# Patient Record
Sex: FEMALE | Race: WHITE | NOT HISPANIC OR LATINO | Employment: STUDENT | URBAN - METROPOLITAN AREA
[De-identification: names, ages, dates, MRNs, and addresses within clinical notes are randomized per-mention and may not be internally consistent; named-entity substitution may affect disease eponyms.]

---

## 2023-07-05 ENCOUNTER — OFFICE VISIT (OUTPATIENT)
Dept: INTERNAL MEDICINE | Facility: CLINIC | Age: 23
End: 2023-07-05
Payer: OTHER GOVERNMENT

## 2023-07-05 VITALS
BODY MASS INDEX: 23.66 KG/M2 | WEIGHT: 142 LBS | SYSTOLIC BLOOD PRESSURE: 130 MMHG | HEIGHT: 65 IN | DIASTOLIC BLOOD PRESSURE: 68 MMHG | HEART RATE: 98 BPM | OXYGEN SATURATION: 99 %

## 2023-07-05 DIAGNOSIS — Z76.89 ENCOUNTER TO ESTABLISH CARE WITH NEW DOCTOR: ICD-10-CM

## 2023-07-05 DIAGNOSIS — F41.9 ANXIETY DISORDER, UNSPECIFIED TYPE: Primary | ICD-10-CM

## 2023-07-05 DIAGNOSIS — F90.8 ATTENTION DEFICIT HYPERACTIVITY DISORDER (ADHD), OTHER TYPE: ICD-10-CM

## 2023-07-05 PROBLEM — F90.9 ATTENTION-DEFICIT HYPERACTIVITY DISORDER, UNSPECIFIED TYPE: Status: ACTIVE | Noted: 2020-10-21

## 2023-07-05 PROBLEM — J30.9 ALLERGIC RHINITIS: Status: ACTIVE | Noted: 2021-06-03

## 2023-07-05 PROBLEM — T24.039A: Status: ACTIVE | Noted: 2021-06-03

## 2023-07-05 PROBLEM — M54.50 LOW BACK PAIN: Status: ACTIVE | Noted: 2020-10-21

## 2023-07-05 PROCEDURE — 99203 OFFICE O/P NEW LOW 30 MIN: CPT | Mod: PBBFAC | Performed by: NURSE PRACTITIONER

## 2023-07-05 PROCEDURE — 99204 OFFICE O/P NEW MOD 45 MIN: CPT | Mod: S$PBB,,, | Performed by: NURSE PRACTITIONER

## 2023-07-05 PROCEDURE — 99999 PR PBB SHADOW E&M-NEW PATIENT-LVL III: ICD-10-PCS | Mod: PBBFAC,,, | Performed by: NURSE PRACTITIONER

## 2023-07-05 PROCEDURE — 99999 PR PBB SHADOW E&M-NEW PATIENT-LVL III: CPT | Mod: PBBFAC,,, | Performed by: NURSE PRACTITIONER

## 2023-07-05 PROCEDURE — 99204 PR OFFICE/OUTPT VISIT, NEW, LEVL IV, 45-59 MIN: ICD-10-PCS | Mod: S$PBB,,, | Performed by: NURSE PRACTITIONER

## 2023-07-05 RX ORDER — ESCITALOPRAM OXALATE 10 MG/1
10 TABLET ORAL DAILY
Qty: 90 TABLET | Refills: 0 | Status: SHIPPED | OUTPATIENT
Start: 2023-07-05 | End: 2023-10-03

## 2023-07-05 RX ORDER — METHYLPHENIDATE HYDROCHLORIDE 18 MG/1
18 TABLET, EXTENDED RELEASE ORAL
COMMUNITY
Start: 2023-06-13 | End: 2023-09-21

## 2023-07-05 RX ORDER — ESCITALOPRAM OXALATE 10 MG/1
10 TABLET ORAL
COMMUNITY
Start: 2023-06-17 | End: 2023-07-05 | Stop reason: SDUPTHER

## 2023-07-05 NOTE — PATIENT INSTRUCTIONS
Sent script for Lexapro to [place] [facility]    You will need to get refills on Concerta from previous prescriber as I cannot prescribe this    When you return, will need to establish with an MD for ongoing care and provide ADHD records to resume refills on that

## 2023-07-05 NOTE — PROGRESS NOTES
Subjective     Patient ID: Soraida Agarwal is a 22 y.o. female.    Chief Complaint: Medication Refill    Pt new to me, here for a refills on Concerta and Lexapro    She sees a therapist locally for Anxiety. Will be moving to [place] for 6 weeks, needs refills to tie her over til she comes back.    ADHD meds have been prescribed by her PCP in [place] who was continuing refills. I advised pt I cannot refill this and she would need to contact them for a temporary refill and when she returns back to [place], she will need to establish with an MD PCP and provide past ADHD records for them to resume refills.    No other issues    Review of Systems   Constitutional:  Negative for activity change, appetite change and unexpected weight change.   HENT:  Negative for dental problem and hearing loss.    Eyes:  Negative for visual disturbance.   Respiratory:  Negative for apnea, cough, chest tightness and shortness of breath.    Cardiovascular:  Negative for chest pain, palpitations and leg swelling.   Gastrointestinal:  Negative for abdominal distention, abdominal pain, anal bleeding, blood in stool, constipation, diarrhea, nausea, rectal pain and vomiting.   Endocrine: Negative for cold intolerance, heat intolerance, polydipsia, polyphagia and polyuria.   Genitourinary:  Negative for difficulty urinating, hematuria, menstrual problem, pelvic pain and vaginal pain.   Musculoskeletal:  Negative for arthralgias.   Integumentary:  Negative for color change.   Allergic/Immunologic: Negative for environmental allergies, food allergies and immunocompromised state.   Neurological:  Negative for dizziness, speech difficulty, weakness, light-headedness, numbness and headaches.   Hematological:  Negative for adenopathy. Does not bruise/bleed easily.   Psychiatric/Behavioral:  Negative for agitation, behavioral problems, sleep disturbance and suicidal ideas.      Review of patient's allergies indicates:   Allergen Reactions     "Pollen extracts Other (See Comments)     Current Outpatient Medications:     CONCERTA 18 mg CR tablet, Take 18 mg by mouth., Disp: , Rfl:     EScitalopram oxalate (LEXAPRO) 10 MG tablet, Take 10 mg by mouth., Disp: , Rfl:     Patient Active Problem List   Diagnosis    Allergic rhinitis    Anxiety disorder, unspecified    Attention-deficit hyperactivity disorder, unspecified type    Burn of lower leg    Low back pain     No past medical history on file.    No past surgical history on file.    Social History     Socioeconomic History    Marital status: Unknown     No family history on file.       Objective   Vitals:    07/05/23 1543   BP: 130/68   Pulse: 98   SpO2: 99%   Weight: 64.4 kg (141 lb 15.6 oz)   Height: 5' 5" (1.651 m)   PainSc: 0-No pain     Body mass index is 23.63 kg/m².    Physical Exam  Vitals and nursing note reviewed.   Constitutional:       Appearance: Normal appearance. She is normal weight.   HENT:      Head: Normocephalic.      Nose: Nose normal.      Mouth/Throat:      Mouth: Mucous membranes are moist.   Eyes:      Conjunctiva/sclera: Conjunctivae normal.   Cardiovascular:      Rate and Rhythm: Normal rate.   Pulmonary:      Effort: Pulmonary effort is normal.   Musculoskeletal:         General: Normal range of motion.      Cervical back: Normal range of motion.   Neurological:      General: No focal deficit present.      Mental Status: She is alert and oriented to person, place, and time.   Psychiatric:         Mood and Affect: Mood normal.         Behavior: Behavior normal.         Thought Content: Thought content normal.         Judgment: Judgment normal.          Assessment and Plan     1. Anxiety disorder, unspecified type  2. Attention deficit hyperactivity disorder (ADHD), other type  -     EScitalopram oxalate (LEXAPRO) 10 MG tablet; Take 1 tablet (10 mg total) by mouth once daily.  Dispense: 90 tablet; Refill: 0    Sent script for Lexapro to [place] [facility]    You will need to get " refills on Concerta from previous prescriber as I cannot prescribe this    When you return, will need to establish with an MD for ongoing care and provide ADHD records to resume refills on that    3. BMI 23.0-23.9, adult  BMI reviewed    4. Encounter to establish care with new doctor  Follow with one of MDs I work with who can be your new PCP for your annual: Dr. Alesia Villa, Dr. Catarino Sullivan    Follow up if symptoms worsen or fail to improve.

## 2023-08-24 ENCOUNTER — OFFICE VISIT (OUTPATIENT)
Dept: INTERNAL MEDICINE | Facility: CLINIC | Age: 23
End: 2023-08-24
Payer: OTHER GOVERNMENT

## 2023-08-24 ENCOUNTER — LAB VISIT (OUTPATIENT)
Dept: LAB | Facility: HOSPITAL | Age: 23
End: 2023-08-24
Payer: OTHER GOVERNMENT

## 2023-08-24 VITALS
DIASTOLIC BLOOD PRESSURE: 62 MMHG | SYSTOLIC BLOOD PRESSURE: 131 MMHG | BODY MASS INDEX: 24.28 KG/M2 | WEIGHT: 145.75 LBS | OXYGEN SATURATION: 98 % | HEIGHT: 65 IN | HEART RATE: 96 BPM

## 2023-08-24 DIAGNOSIS — G89.29 OTHER CHRONIC PAIN: ICD-10-CM

## 2023-08-24 DIAGNOSIS — M54.2 NECK PAIN: ICD-10-CM

## 2023-08-24 DIAGNOSIS — Z00.00 ANNUAL PHYSICAL EXAM: Primary | ICD-10-CM

## 2023-08-24 DIAGNOSIS — Z13.1 SCREENING FOR DIABETES MELLITUS (DM): ICD-10-CM

## 2023-08-24 DIAGNOSIS — Z13.220 SCREENING FOR LIPID DISORDERS: ICD-10-CM

## 2023-08-24 DIAGNOSIS — R53.82 CHRONIC FATIGUE: ICD-10-CM

## 2023-08-24 DIAGNOSIS — G47.411 NARCOLEPSY AND CATAPLEXY: ICD-10-CM

## 2023-08-24 LAB
25(OH)D3+25(OH)D2 SERPL-MCNC: 46 NG/ML (ref 30–96)
ALBUMIN SERPL BCP-MCNC: 4 G/DL (ref 3.5–5.2)
ALP SERPL-CCNC: 60 U/L (ref 55–135)
ALT SERPL W/O P-5'-P-CCNC: 16 U/L (ref 10–44)
ANION GAP SERPL CALC-SCNC: 8 MMOL/L (ref 8–16)
AST SERPL-CCNC: 21 U/L (ref 10–40)
BASOPHILS # BLD AUTO: 0.02 K/UL (ref 0–0.2)
BASOPHILS NFR BLD: 0.4 % (ref 0–1.9)
BILIRUB SERPL-MCNC: 0.4 MG/DL (ref 0.1–1)
BUN SERPL-MCNC: 8 MG/DL (ref 6–20)
CALCIUM SERPL-MCNC: 9.8 MG/DL (ref 8.7–10.5)
CHLORIDE SERPL-SCNC: 106 MMOL/L (ref 95–110)
CHOLEST SERPL-MCNC: 162 MG/DL (ref 120–199)
CHOLEST/HDLC SERPL: 3 {RATIO} (ref 2–5)
CO2 SERPL-SCNC: 25 MMOL/L (ref 23–29)
CREAT SERPL-MCNC: 0.7 MG/DL (ref 0.5–1.4)
CRP SERPL-MCNC: 0.7 MG/L (ref 0–8.2)
DIFFERENTIAL METHOD: NORMAL
EOSINOPHIL # BLD AUTO: 0.1 K/UL (ref 0–0.5)
EOSINOPHIL NFR BLD: 1.2 % (ref 0–8)
ERYTHROCYTE [DISTWIDTH] IN BLOOD BY AUTOMATED COUNT: 12.6 % (ref 11.5–14.5)
ERYTHROCYTE [SEDIMENTATION RATE] IN BLOOD BY PHOTOMETRIC METHOD: 9 MM/HR (ref 0–36)
EST. GFR  (NO RACE VARIABLE): >60 ML/MIN/1.73 M^2
ESTIMATED AVG GLUCOSE: 97 MG/DL (ref 68–131)
GLUCOSE SERPL-MCNC: 75 MG/DL (ref 70–110)
HBA1C MFR BLD: 5 % (ref 4–5.6)
HCT VFR BLD AUTO: 38.2 % (ref 37–48.5)
HCV AB SERPL QL IA: NORMAL
HDLC SERPL-MCNC: 54 MG/DL (ref 40–75)
HDLC SERPL: 33.3 % (ref 20–50)
HGB BLD-MCNC: 12.3 G/DL (ref 12–16)
HIV 1+2 AB+HIV1 P24 AG SERPL QL IA: NORMAL
IMM GRANULOCYTES # BLD AUTO: 0.01 K/UL (ref 0–0.04)
IMM GRANULOCYTES NFR BLD AUTO: 0.2 % (ref 0–0.5)
LDLC SERPL CALC-MCNC: 90.2 MG/DL (ref 63–159)
LYMPHOCYTES # BLD AUTO: 2.2 K/UL (ref 1–4.8)
LYMPHOCYTES NFR BLD: 43.1 % (ref 18–48)
MCH RBC QN AUTO: 28.4 PG (ref 27–31)
MCHC RBC AUTO-ENTMCNC: 32.2 G/DL (ref 32–36)
MCV RBC AUTO: 88 FL (ref 82–98)
MONOCYTES # BLD AUTO: 0.5 K/UL (ref 0.3–1)
MONOCYTES NFR BLD: 8.9 % (ref 4–15)
NEUTROPHILS # BLD AUTO: 2.3 K/UL (ref 1.8–7.7)
NEUTROPHILS NFR BLD: 46.2 % (ref 38–73)
NONHDLC SERPL-MCNC: 108 MG/DL
NRBC BLD-RTO: 0 /100 WBC
PLATELET # BLD AUTO: 292 K/UL (ref 150–450)
PMV BLD AUTO: 10.6 FL (ref 9.2–12.9)
POTASSIUM SERPL-SCNC: 4 MMOL/L (ref 3.5–5.1)
PROT SERPL-MCNC: 7.1 G/DL (ref 6–8.4)
RBC # BLD AUTO: 4.33 M/UL (ref 4–5.4)
SODIUM SERPL-SCNC: 139 MMOL/L (ref 136–145)
T4 FREE SERPL-MCNC: 0.85 NG/DL (ref 0.71–1.51)
TRIGL SERPL-MCNC: 89 MG/DL (ref 30–150)
TSH SERPL DL<=0.005 MIU/L-ACNC: 1.46 UIU/ML (ref 0.4–4)
VIT B12 SERPL-MCNC: 381 PG/ML (ref 210–950)
WBC # BLD AUTO: 5.03 K/UL (ref 3.9–12.7)

## 2023-08-24 PROCEDURE — 99999 PR PBB SHADOW E&M-EST. PATIENT-LVL IV: ICD-10-PCS | Mod: PBBFAC,,, | Performed by: INTERNAL MEDICINE

## 2023-08-24 PROCEDURE — 36415 COLL VENOUS BLD VENIPUNCTURE: CPT | Performed by: INTERNAL MEDICINE

## 2023-08-24 PROCEDURE — 83036 HEMOGLOBIN GLYCOSYLATED A1C: CPT | Performed by: INTERNAL MEDICINE

## 2023-08-24 PROCEDURE — 86140 C-REACTIVE PROTEIN: CPT | Performed by: INTERNAL MEDICINE

## 2023-08-24 PROCEDURE — 87389 HIV-1 AG W/HIV-1&-2 AB AG IA: CPT | Performed by: INTERNAL MEDICINE

## 2023-08-24 PROCEDURE — 86803 HEPATITIS C AB TEST: CPT | Performed by: INTERNAL MEDICINE

## 2023-08-24 PROCEDURE — 99395 PREV VISIT EST AGE 18-39: CPT | Mod: S$PBB,,, | Performed by: INTERNAL MEDICINE

## 2023-08-24 PROCEDURE — 85652 RBC SED RATE AUTOMATED: CPT | Performed by: INTERNAL MEDICINE

## 2023-08-24 PROCEDURE — 84439 ASSAY OF FREE THYROXINE: CPT | Performed by: INTERNAL MEDICINE

## 2023-08-24 PROCEDURE — 99214 OFFICE O/P EST MOD 30 MIN: CPT | Mod: PBBFAC | Performed by: INTERNAL MEDICINE

## 2023-08-24 PROCEDURE — 82607 VITAMIN B-12: CPT | Performed by: INTERNAL MEDICINE

## 2023-08-24 PROCEDURE — 84443 ASSAY THYROID STIM HORMONE: CPT | Performed by: INTERNAL MEDICINE

## 2023-08-24 PROCEDURE — 85025 COMPLETE CBC W/AUTO DIFF WBC: CPT | Performed by: INTERNAL MEDICINE

## 2023-08-24 PROCEDURE — 99395 PR PREVENTIVE VISIT,EST,18-39: ICD-10-PCS | Mod: S$PBB,,, | Performed by: INTERNAL MEDICINE

## 2023-08-24 PROCEDURE — 82306 VITAMIN D 25 HYDROXY: CPT | Performed by: INTERNAL MEDICINE

## 2023-08-24 PROCEDURE — 99999 PR PBB SHADOW E&M-EST. PATIENT-LVL IV: CPT | Mod: PBBFAC,,, | Performed by: INTERNAL MEDICINE

## 2023-08-24 PROCEDURE — 80053 COMPREHEN METABOLIC PANEL: CPT | Performed by: INTERNAL MEDICINE

## 2023-08-24 PROCEDURE — 80061 LIPID PANEL: CPT | Performed by: INTERNAL MEDICINE

## 2023-08-24 RX ORDER — MELOXICAM 7.5 MG/1
7.5 TABLET ORAL DAILY
Qty: 30 TABLET | Refills: 2 | Status: SHIPPED | OUTPATIENT
Start: 2023-08-24 | End: 2023-11-22

## 2023-08-24 NOTE — PROGRESS NOTES
Subjective:       Patient ID: Soraida Agarwal is a 23 y.o. female.    Chief Complaint: Establish Care    HPI      Presents to establish care.     PMHx    Anxiety on Lexapro also diagnosed with OCD and PTSD.     ADD: on Concerta     Chronic neck and back pain.     Chronic fatigue, takes frequent naps.       Health Maintenance:  HIV: order today   Hep C: order today   Lipids: order today   Pap Smear: needs to schedule with GYN   Vaccines:  up to date     Family hx of Hashimotos     Never smoker. Going to [place] for Grad school to study [place] language.         Review of Systems   Constitutional:  Negative for activity change, appetite change and chills.   HENT:  Negative for ear pain, sinus pressure/congestion and sneezing.    Respiratory:  Negative for cough and shortness of breath.    Cardiovascular:  Negative for chest pain, palpitations and leg swelling.   Gastrointestinal:  Negative for abdominal distention, abdominal pain, constipation, diarrhea, nausea and vomiting.   Genitourinary:  Negative for dysuria and hematuria.   Musculoskeletal:  Negative for arthralgias, back pain and myalgias.   Neurological:  Negative for dizziness and headaches.   Psychiatric/Behavioral:  Negative for agitation. The patient is not nervous/anxious.            No past medical history on file.  No past surgical history on file.   Patient Active Problem List   Diagnosis    Allergic rhinitis    Anxiety disorder, unspecified    Attention-deficit hyperactivity disorder, unspecified type    Burn of lower leg    Low back pain        Objective:      Physical Exam  Constitutional:       Appearance: Normal appearance.   HENT:      Head: Normocephalic.      Right Ear: Tympanic membrane normal.      Left Ear: Tympanic membrane normal.      Nose: Nose normal.   Cardiovascular:      Rate and Rhythm: Normal rate and regular rhythm.      Pulses: Normal pulses.      Heart sounds: Normal heart sounds.   Pulmonary:      Effort: Pulmonary effort is  normal.      Breath sounds: Normal breath sounds.   Abdominal:      General: Abdomen is flat. Bowel sounds are normal.      Palpations: Abdomen is soft.   Musculoskeletal:         General: Normal range of motion.      Cervical back: Normal range of motion and neck supple.   Skin:     General: Skin is warm and dry.   Neurological:      General: No focal deficit present.      Mental Status: She is alert and oriented to person, place, and time.   Psychiatric:         Mood and Affect: Mood normal.         Assessment:       Problem List Items Addressed This Visit    None  Visit Diagnoses       Annual physical exam    -  Primary    Narcolepsy and cataplexy        Relevant Orders    Ambulatory referral/consult to Sleep Disorders    Neck pain        Screening for lipid disorders        Relevant Orders    Lipid Panel    Screening for diabetes mellitus (DM)        Relevant Orders    Hemoglobin A1C    Chronic fatigue        Relevant Orders    Hepatitis C antibody    HIV 1/2 Ag/Ab (4th Gen)    Comprehensive Metabolic Panel    CBC Auto Differential    TSH    Sedimentation rate    C-REACTIVE PROTEIN    T4, FREE    Vitamin D 25 hydroxy    VITAMIN B12    Other chronic pain        Relevant Orders    Sedimentation rate    C-REACTIVE PROTEIN            Plan:         Soraida was seen today for establish care.    Diagnoses and all orders for this visit:    Annual physical exam  HIV: order today   Hep C: order today   Lipids: order today   Pap Smear: needs to schedule with GYN   Vaccines:  up to date     Narcolepsy and cataplexy  -     Ambulatory referral/consult to Sleep Disorders; Future    Neck pain  Other chronic pain  -     Sedimentation rate; Future  -     C-REACTIVE PROTEIN; Future    Screening for lipid disorders  -     Cancel: Lipid panel; Future  -     Lipid Panel; Future    Screening for diabetes mellitus (DM)  -     Hemoglobin A1C; Future    Chronic fatigue  Check labs and refer to sleep medicine               Tanya  MD Danette   Internal Medicine   Primary Care

## 2023-08-25 ENCOUNTER — PATIENT MESSAGE (OUTPATIENT)
Dept: INTERNAL MEDICINE | Facility: CLINIC | Age: 23
End: 2023-08-25
Payer: OTHER GOVERNMENT

## 2023-09-11 ENCOUNTER — PATIENT MESSAGE (OUTPATIENT)
Dept: INTERNAL MEDICINE | Facility: CLINIC | Age: 23
End: 2023-09-11
Payer: OTHER GOVERNMENT

## 2023-09-11 DIAGNOSIS — G47.411 NARCOLEPSY AND CATAPLEXY: Primary | ICD-10-CM

## 2023-09-21 ENCOUNTER — PATIENT MESSAGE (OUTPATIENT)
Dept: INTERNAL MEDICINE | Facility: CLINIC | Age: 23
End: 2023-09-21
Payer: OTHER GOVERNMENT

## 2023-09-21 RX ORDER — METHYLPHENIDATE HYDROCHLORIDE 18 MG/1
18 TABLET ORAL EVERY MORNING
Qty: 30 TABLET | Refills: 0 | Status: SHIPPED | OUTPATIENT
Start: 2023-09-21

## 2023-10-30 ENCOUNTER — PATIENT MESSAGE (OUTPATIENT)
Dept: INTERNAL MEDICINE | Facility: CLINIC | Age: 23
End: 2023-10-30
Payer: OTHER GOVERNMENT

## 2024-02-06 ENCOUNTER — PATIENT MESSAGE (OUTPATIENT)
Dept: INTERNAL MEDICINE | Facility: CLINIC | Age: 24
End: 2024-02-06
Payer: OTHER GOVERNMENT

## 2024-06-10 ENCOUNTER — PATIENT MESSAGE (OUTPATIENT)
Dept: INTERNAL MEDICINE | Facility: CLINIC | Age: 24
End: 2024-06-10
Payer: OTHER GOVERNMENT

## 2024-06-23 DIAGNOSIS — F41.9 ANXIETY DISORDER, UNSPECIFIED TYPE: ICD-10-CM

## 2024-06-23 NOTE — TELEPHONE ENCOUNTER
No care due was identified.  [facility] Embedded Care Due Messages. Reference number: [id number].   6/23/2024 10:23:32 AM CDT

## 2024-06-24 ENCOUNTER — HOSPITAL ENCOUNTER (OUTPATIENT)
Dept: RADIOLOGY | Facility: HOSPITAL | Age: 24
Discharge: HOME | End: 2024-06-24
Attending: INTERNAL MEDICINE
Payer: OTHER GOVERNMENT

## 2024-06-24 ENCOUNTER — OFFICE VISIT (OUTPATIENT)
Dept: INTERNAL MEDICINE | Facility: CLINIC | Age: 24
End: 2024-06-24
Payer: OTHER GOVERNMENT

## 2024-06-24 VITALS
HEIGHT: 65 IN | SYSTOLIC BLOOD PRESSURE: 106 MMHG | DIASTOLIC BLOOD PRESSURE: 73 MMHG | HEART RATE: 78 BPM | WEIGHT: 143.06 LBS | OXYGEN SATURATION: 99 % | BODY MASS INDEX: 23.83 KG/M2

## 2024-06-24 DIAGNOSIS — M54.50 LUMBAR BACK PAIN: ICD-10-CM

## 2024-06-24 DIAGNOSIS — F41.9 ANXIETY DISORDER, UNSPECIFIED TYPE: ICD-10-CM

## 2024-06-24 DIAGNOSIS — F90.8 ATTENTION DEFICIT HYPERACTIVITY DISORDER (ADHD), OTHER TYPE: ICD-10-CM

## 2024-06-24 DIAGNOSIS — M54.50 LUMBAR BACK PAIN: Primary | ICD-10-CM

## 2024-06-24 PROCEDURE — 72070 X-RAY EXAM THORAC SPINE 2VWS: CPT | Mod: TC

## 2024-06-24 PROCEDURE — 72070 X-RAY EXAM THORAC SPINE 2VWS: CPT | Mod: 26,,, | Performed by: RADIOLOGY

## 2024-06-24 PROCEDURE — 72100 X-RAY EXAM L-S SPINE 2/3 VWS: CPT | Mod: TC

## 2024-06-24 PROCEDURE — 72100 X-RAY EXAM L-S SPINE 2/3 VWS: CPT | Mod: 26,,, | Performed by: RADIOLOGY

## 2024-06-24 PROCEDURE — 99999 PR PBB SHADOW E&M-EST. PATIENT-LVL IV: CPT | Mod: PBBFAC,,, | Performed by: INTERNAL MEDICINE

## 2024-06-24 PROCEDURE — 99214 OFFICE O/P EST MOD 30 MIN: CPT | Mod: PBBFAC,25 | Performed by: INTERNAL MEDICINE

## 2024-06-24 PROCEDURE — 99214 OFFICE O/P EST MOD 30 MIN: CPT | Mod: S$PBB,,, | Performed by: INTERNAL MEDICINE

## 2024-06-24 RX ORDER — ESCITALOPRAM OXALATE 5 MG/1
TABLET ORAL
Qty: 30 TABLET | Refills: 11 | Status: SHIPPED | OUTPATIENT
Start: 2024-06-24

## 2024-06-24 RX ORDER — ESCITALOPRAM OXALATE 10 MG/1
10 TABLET ORAL DAILY
Qty: 90 TABLET | Refills: 3 | Status: SHIPPED | OUTPATIENT
Start: 2024-06-24

## 2024-06-24 NOTE — TELEPHONE ENCOUNTER
Refill Routing Note   Medication(s) are not appropriate for processing by [facility] for the following reason(s):        No active prescription written by provider: not yet signed by current PCP    [facility] action(s):  Defer      Medication Therapy Plan:         Appointments  past 12m or future 3m with PCP    Date Provider   Last Visit   8/24/2023 Tanya Samaniego MD   Next Visit   6/23/2024 Tanya Samaniego MD   ED visits in past 90 days: 0        Note composed:9:58 AM 06/24/2024

## 2024-06-24 NOTE — TELEPHONE ENCOUNTER
No care due was identified.  [facility] Embedded Care Due Messages. Reference number: [id number].   6/24/2024 9:58:39 AM CDT

## 2024-06-24 NOTE — PROGRESS NOTES
Subjective:       Patient ID: Soraida Agarwal is a 23 y.o. female.    Chief Complaint: Follow-up    Presents for follow up.     Having lumbar back pain. Worse with bending back or bending forward. Also worse with jumping.  The pain started in February on March while she was living in [place] and doing gymnastics.  She did see a physician there who recommended an MRI but also recommended she wait till she did back to the [facility] as the wait time would have been very long.  She has limited her exercises and movements because of the pain.    PMHx    Anxiety on Lexapro also diagnosed with OCD and PTSD.     ADD: on Concerta     Chronic neck and back pain.     Chronic fatigue, takes frequent naps.       Health Maintenance:  HIV:  Negative 2023  Hep C: Negative 2023  Lipids:  Normal August 20, 2023  Pap Smear: needs to schedule with GYN   Vaccines:  up to date     Family hx of Hashimotos     Never smoker. Going to [place] for Grad school to study English language.       Follow-up  Pertinent negatives include no abdominal pain, arthralgias, chest pain, chills, coughing, headaches, myalgias, nausea or vomiting.         Review of Systems   Constitutional:  Negative for activity change, appetite change and chills.   HENT:  Negative for ear pain, sinus pressure/congestion and sneezing.    Respiratory:  Negative for cough and shortness of breath.    Cardiovascular:  Negative for chest pain, palpitations and leg swelling.   Gastrointestinal:  Negative for abdominal distention, abdominal pain, constipation, diarrhea, nausea and vomiting.   Genitourinary:  Negative for dysuria and hematuria.   Musculoskeletal:  Negative for arthralgias, back pain and myalgias.   Neurological:  Negative for dizziness and headaches.   Psychiatric/Behavioral:  Negative for agitation. The patient is not nervous/anxious.            No past medical history on file.  No past surgical history on file.   Patient Active Problem List   Diagnosis    Allergic  rhinitis    Anxiety disorder, unspecified    Attention-deficit hyperactivity disorder, unspecified type    Burn of lower leg    Low back pain        Objective:      Physical Exam  Constitutional:       Appearance: Normal appearance.   HENT:      Head: Normocephalic.      Right Ear: Tympanic membrane normal.      Left Ear: Tympanic membrane normal.      Nose: Nose normal.   Cardiovascular:      Rate and Rhythm: Normal rate and regular rhythm.      Pulses: Normal pulses.      Heart sounds: Normal heart sounds.   Pulmonary:      Effort: Pulmonary effort is normal.      Breath sounds: Normal breath sounds.   Abdominal:      General: Abdomen is flat. Bowel sounds are normal.      Palpations: Abdomen is soft.   Musculoskeletal:         General: Normal range of motion.      Cervical back: Normal range of motion and neck supple.   Skin:     General: Skin is warm and dry.   Neurological:      General: No focal deficit present.      Mental Status: She is alert and oriented to person, place, and time.   Psychiatric:         Mood and Affect: Mood normal.         Assessment:       Problem List Items Addressed This Visit    None        Plan:         Soraida was seen today for follow-up.    Diagnoses and all orders for this visit:    Lumbar back pain  -     X-Ray Lumbar Spine 2 Or 3 Views; Future  -     Ambulatory referral/consult to Physical/Occupational Therapy; Future  -     X-Ray Thoracic Spine AP Lateral; Future  Check x-rays today in place referral to PT  Consider MRI if x-rays do not show a cause of pain    Attention deficit hyperactivity disorder (ADHD), other type  Continue Methylpenidate 18 mg daily   Doing well on this dose. Denies any chest pain, palpitations, insomnia or decreased appetite.      Anxiety disorder, unspecified type  -     EScitalopram oxalate (LEXAPRO) 5 MG Tab; Take in addition to 10 mg tablet the week before cycle begins.  Doing well on this dose.  She is now started taking 10 mg the week before her  cycle and this has been working out well for her        Tanya Samaniego MD   Internal Medicine   Primary Care

## 2024-06-24 NOTE — TELEPHONE ENCOUNTER
Refill Routing Note   Medication(s) are not appropriate for processing by [facility] for the following reason(s):        Outside of protocol    [facility] action(s):  Route               Appointments  past 12m or future 3m with PCP    Date Provider   Last Visit   8/24/2023 Tanya Samaniego MD   Next Visit   6/24/2024 Tanya Samaniego MD   ED visits in past 90 days: 0        Note composed:12:46 PM 06/24/2024

## 2024-06-25 ENCOUNTER — PATIENT MESSAGE (OUTPATIENT)
Dept: INTERNAL MEDICINE | Facility: CLINIC | Age: 24
End: 2024-06-25
Payer: OTHER GOVERNMENT

## 2024-06-25 RX ORDER — METHYLPHENIDATE HYDROCHLORIDE 18 MG/1
18 TABLET ORAL EVERY MORNING
Qty: 30 TABLET | Refills: 0 | Status: SHIPPED | OUTPATIENT
Start: 2024-06-25

## 2024-06-26 ENCOUNTER — TELEPHONE (OUTPATIENT)
Dept: INTERNAL MEDICINE | Facility: CLINIC | Age: 24
End: 2024-06-26
Payer: OTHER GOVERNMENT

## 2024-06-26 DIAGNOSIS — M54.50 LUMBAR BACK PAIN: ICD-10-CM

## 2024-06-26 DIAGNOSIS — M54.9 DORSALGIA, UNSPECIFIED: Primary | ICD-10-CM

## 2024-06-26 NOTE — TELEPHONE ENCOUNTER
----- Message from Tanya Samaniego MD sent at 6/26/2024 12:44 PM CDT -----  Please help her schedule the MRI

## 2024-06-27 ENCOUNTER — HOSPITAL ENCOUNTER (OUTPATIENT)
Dept: RADIOLOGY | Facility: HOSPITAL | Age: 24
Discharge: HOME | End: 2024-06-27
Attending: INTERNAL MEDICINE
Payer: OTHER GOVERNMENT

## 2024-06-27 DIAGNOSIS — M54.9 DORSALGIA, UNSPECIFIED: ICD-10-CM

## 2024-06-27 DIAGNOSIS — M54.2 NECK PAIN: Primary | ICD-10-CM

## 2024-06-27 DIAGNOSIS — M54.50 LUMBAR BACK PAIN: ICD-10-CM

## 2024-06-27 PROCEDURE — 72148 MRI LUMBAR SPINE W/O DYE: CPT | Mod: 26,,, | Performed by: INTERNAL MEDICINE

## 2024-06-27 PROCEDURE — 72148 MRI LUMBAR SPINE W/O DYE: CPT | Mod: TC

## 2024-06-27 NOTE — TELEPHONE ENCOUNTER
----- Message from Homa Rivera sent at 6/27/2024  1:46 PM CDT -----  Contact: [phone]  Type: Returning a call    Who left a message? Missed the call     When did the practice call? 1:00 Pm     Does patient know what this is regarding: Imaging results     Would the patient rather a call back or a response via [facility]? Call back     Comments:

## 2024-06-28 ENCOUNTER — PATIENT MESSAGE (OUTPATIENT)
Dept: INTERNAL MEDICINE | Facility: CLINIC | Age: 24
End: 2024-06-28
Payer: OTHER GOVERNMENT

## 2024-06-28 ENCOUNTER — TELEPHONE (OUTPATIENT)
Dept: INTERNAL MEDICINE | Facility: CLINIC | Age: 24
End: 2024-06-28

## 2024-06-28 DIAGNOSIS — M54.2 NECK PAIN: Primary | ICD-10-CM

## 2024-06-28 NOTE — TELEPHONE ENCOUNTER
Pt now requesting films of c-spine, states she has pain since 2019 that interferes with her daily life.Pended if appropriate

## 2024-06-29 ENCOUNTER — HOSPITAL ENCOUNTER (OUTPATIENT)
Dept: RADIOLOGY | Facility: HOSPITAL | Age: 24
Discharge: HOME | End: 2024-06-29
Attending: INTERNAL MEDICINE
Payer: OTHER GOVERNMENT

## 2024-06-29 DIAGNOSIS — M54.2 NECK PAIN: ICD-10-CM

## 2024-06-29 PROCEDURE — 72052 X-RAY EXAM NECK SPINE 6/>VWS: CPT | Mod: 26,,, | Performed by: RADIOLOGY

## 2024-06-29 PROCEDURE — 72052 X-RAY EXAM NECK SPINE 6/>VWS: CPT | Mod: TC

## 2024-07-01 ENCOUNTER — TELEPHONE (OUTPATIENT)
Dept: PAIN MEDICINE | Facility: CLINIC | Age: 24
End: 2024-07-01
Payer: OTHER GOVERNMENT

## 2024-07-01 ENCOUNTER — OFFICE VISIT (OUTPATIENT)
Dept: ORTHOPEDICS | Facility: CLINIC | Age: 24
End: 2024-07-01
Payer: OTHER GOVERNMENT

## 2024-07-01 ENCOUNTER — PATIENT MESSAGE (OUTPATIENT)
Dept: ORTHOPEDICS | Facility: CLINIC | Age: 24
End: 2024-07-01

## 2024-07-01 VITALS — WEIGHT: 143.19 LBS | BODY MASS INDEX: 23.86 KG/M2 | HEIGHT: 65 IN

## 2024-07-01 DIAGNOSIS — M54.16 LUMBAR RADICULOPATHY: Primary | ICD-10-CM

## 2024-07-01 DIAGNOSIS — M50.30 DDD (DEGENERATIVE DISC DISEASE), CERVICAL: ICD-10-CM

## 2024-07-01 DIAGNOSIS — M50.30 DDD (DEGENERATIVE DISC DISEASE), CERVICAL: Primary | ICD-10-CM

## 2024-07-01 DIAGNOSIS — M54.50 LUMBAR BACK PAIN: ICD-10-CM

## 2024-07-01 DIAGNOSIS — M54.50 LUMBAR BACK PAIN: Primary | ICD-10-CM

## 2024-07-01 PROCEDURE — 99999 PR PBB SHADOW E&M-EST. PATIENT-LVL III: CPT | Mod: PBBFAC,,, | Performed by: ORTHOPAEDIC SURGERY

## 2024-07-01 PROCEDURE — 99213 OFFICE O/P EST LOW 20 MIN: CPT | Mod: PBBFAC | Performed by: ORTHOPAEDIC SURGERY

## 2024-07-01 PROCEDURE — 99205 OFFICE O/P NEW HI 60 MIN: CPT | Mod: S$PBB,,, | Performed by: ORTHOPAEDIC SURGERY

## 2024-07-01 NOTE — H&P (VIEW-ONLY)
DATE: 7/1/2024  PATIENT: Soraida Agarwal    Supervising Physician: Gabriel Noel M.D.    CHIEF COMPLAINT: neck pain    HISTORY:  Soraida Agarwal is a 23 y.o. female here for initial evaluation of low back and bilateral leg pain (Back - 3, Leg - 3). The pain has been present for years, worsening 5 months ago. Pt says she does regular sports/gymnastics The patient describes the pain as aching and randomly shooting down both legs.  The pain is worse with activity and improved by rest. There is positive associated numbness and tingling. There is negative subjective weakness. Prior treatments have included PT with continued home exercises for 8 weeks in the last 3 months, but no ESIs or surgery. It is the [employer] spine conditioning program. Exercises include head rolls, kneeling back extension, sitting rotation stretch, modified seated side straddle, knee to chest, bird dog, plank, modified seated plank, hip bridges, abdominal bracing, and abdominal crunch. Pt completes each exercise daily for 1 hour with worsening of pain.    The patient also has complaints of neck pain (Neck - 3, Arm - 0).  The pain has been present for years. The patient describes the pain as aching and radiating up causing headaches. The pain is worse with activity and improved by rest. There is negative associated numbness and tingling. There is negative subjective weakness. Prior treatments have included home exercises, but no ESIs or surgery. It is the [employer] cervical exercise program. Exercises include walking erectly with neutral head position, supine neutral head position, supine retraction, sitting or standing neck retraction, posture training, forward/backward/sideward isometric strengthening, prone head lifts, supine head lifts, scapular retraction, and neck rotation. Pt completes each exercise daily for 1 hour with worsening of pain.    The patient denies myelopathic symptoms such as handwriting changes or difficulty  with buttons/coins/keys. Denies perineal paresthesias, bowel/bladder dysfunction.    PAST MEDICAL/SURGICAL HISTORY:  History reviewed. No pertinent past medical history.  History reviewed. No pertinent surgical history.    Medications:   Current Outpatient Medications on File Prior to Visit   Medication Sig Dispense Refill    EScitalopram oxalate (LEXAPRO) 10 MG tablet Take 1 tablet (10 mg total) by mouth once daily. 90 tablet 3    EScitalopram oxalate (LEXAPRO) 5 MG Tab Take in addition to 10 mg tablet the week before cycle begins. 30 tablet 11    methylphenidate HCl 18 MG CR tablet Take 1 tablet (18 mg total) by mouth every morning. 30 tablet 0     No current facility-administered medications on file prior to visit.       Social History:   Social History     Socioeconomic History    Marital status: Unknown   Tobacco Use    Smoking status: Never    Smokeless tobacco: Never     Social Determinants of Health     Financial Resource Strain: Low Risk  (6/17/2024)    Overall Financial Resource Strain (CARDIA)     Difficulty of Paying Living Expenses: Not very hard   Food Insecurity: No Food Insecurity (6/17/2024)    Hunger Vital Sign     Worried About Running Out of Food in the Last Year: Never true     Ran Out of Food in the Last Year: Never true   Physical Activity: Sufficiently Active (6/17/2024)    Exercise Vital Sign     Days of Exercise per Week: 4 days     Minutes of Exercise per Session: 60 min   Stress: Stress Concern Present (6/17/2024)    [employer] [place] [employer] - Occupational Stress Questionnaire     Feeling of Stress : Rather much   Housing Stability: Unknown (6/17/2024)    Housing Stability Vital Sign     Unable to Pay for Housing in the Last Year: No       REVIEW OF SYSTEMS:  Constitution: Negative. Negative for chills, fever and night sweats.   Cardiovascular: Negative for chest pain and syncope.   Respiratory: Negative for cough and shortness of breath.   Gastrointestinal: See HPI.  "Negative for nausea/vomiting. Negative for abdominal pain.  Genitourinary: See HPI. Negative for discoloration or dysuria.  Skin: Negative for dry skin, itching and rash.   Hematologic/Lymphatic: Negative for bleeding problem. Does not bruise/bleed easily.   Musculoskeletal: Negative for falls and muscle weakness.   Neurological: See HPI. No seizures.   Endocrine: Negative for polydipsia, polyphagia and polyuria.   Allergic/Immunologic: Negative for hives and persistent infections.     EXAM:  Ht 5' 5" (1.651 m)   Wt 64.9 kg (143 lb 3 oz)   BMI 23.83 kg/m²     PHYSICAL EXAMINATION:    General: The patient is a very pleasant 23 y.o. female in no apparent distress, the patient is oriented to person, place and time.  Psych: Normal mood and affect  HEENT: Vision grossly intact, hearing intact to the spoken word.  Lungs: Respirations unlabored.  Gait: Normal station and gait, no difficulty with toe or heel walk.   Skin: Cervical skin and dorsal lumbar skin negative for rashes, lesions, hairy patches and surgical scars.    Range of motion: Cervical and lumbar range of motion is acceptable. There is negative tenderness to palpation of the paracervical muscles.  There is negative lumbar tenderness to palpation.  Spinal Balance: Global saggital and coronal spinal balance acceptable, no significant for scoliosis and kyphosis.  Musculoskeletal: No pain with the range of motion of the bilateral shoulders and elbows. Normal bulk and contour of the bilateral hands.  No pain with the range of motion of the bilateral hips. Mild bilateral trochanteric tenderness to palpation.  Vascular: Bilateral upper and lower extremities warm and well perfused, radial pulses 2+ bilaterally, dorsalis pedis pulses 2+ bilaterally.  Neurological: Normal strength and tone in all major motor groups in the bilateral upper and lower extremities. Normal sensation to light touch in the C5-T1 and L2-S1 dermatomes bilaterally.  Deep tendon reflexes " symmetric 2+ in the bilateral upper and lower extremities.  Negative Inverted Radial Reflex and Torrez's bilaterally. Negative Babinski bilaterally. Negative straight leg raise bilaterally.     IMAGING:   Today I personally reviewed AP, Lat and Flex/Ex  upright C-spine films that demonstrate minimal degenerative changes.    AP, Lat and Flex/Ex upright lumbar spine films demonstrate minimal degenerative changes.    MRI lumbar demonstrates central disc bulge at L4-5 resulting in mild stenosis    Body mass index is 23.83 kg/m².    Hemoglobin A1C   Date Value Ref Range Status   08/24/2023 5.0 4.0 - 5.6 % Final     Comment:     ADA Screening Guidelines:  5.7-6.4%  Consistent with prediabetes  >or=6.5%  Consistent with diabetes    High levels of fetal hemoglobin interfere with the HbA1C  assay. Heterozygous hemoglobin variants (HbS, HgC, etc)do  not significantly interfere with this assay.   However, presence of multiple variants may affect accuracy.           ASSESSMENT/PLAN:    Soraida was seen today for neck pain.    Diagnoses and all orders for this visit:    DDD (degenerative disc disease), cervical  -     MRI Cervical Spine Without Contrast; Future    Lumbar back pain  -     Ambulatory referral/consult to Orthopedics        Today we discussed at length all of the different treatment options including anti-inflammatories, acetaminophen, rest, ice, heat, physical therapy including strengthening and stretching exercises, home exercises, ROM, aerobic conditioning, aqua therapy, other modalities including ultrasound, massage, and dry needling, epidural steroid injections and finally surgical intervention.      Pt presents with chronic neck and low back pain with lumbar radiculopathy. Failure of conservative rx. Will obtain cervical MRI to further evaluate and call with results. Will order L4-5 ART with pain management.

## 2024-07-01 NOTE — TELEPHONE ENCOUNTER
----- Message from Jennifer Rolon PA-C sent at 2024 10:29 AM CDT -----  Regarding: Order for ANN-MARIE CASE    Patient Name: ANN-MARIE CASE([id number])  Sex: Female  : 2000      PCP: GEORGE SHI    Center: None     Types of orders made on 2024: Imaging, Outpatient Referral, Procedure                                       Request    Order Date:2024  Ordering User:JENNIFER ROLON [[id number]]  Encounter Pr  ovider:Jennifer Rolon PA-C [[id number]]  Authorizing Provider: Jennifer Rooln PA-C [[id number]]  Supervising Provider:VINCE GOLDSMITH [[id number]]  Type of Supervision:Supervision Required  Department:[facility][[id number]]    Common Order Information  Procedure -> Epidural Injection (specify level) Cmt: L4-5    Order Specific Information  Order: Procedure Order to Pain Management [Custom: [id number]]  Order   #:          [id number]: 1 FUTURE    Priority: Routine  Class: Clinic Performed    Future Order Information      Expires on:2025            Expected by:2024                   Associated Diagnoses      M54.50 Lumbar back pain      Facility Name: -> [facility]           Priority: Routine  Class: Clinic Performed    Future Order Information      Expires on:2025              Expected by:2024                   Associated Diagnoses      M54.50 Lumbar back pain      Procedure -> Epidural Injection (specify level) Cmt: L4-5        Facility Name: -> [facility]

## 2024-07-01 NOTE — PROGRESS NOTES
DATE: 7/1/2024  PATIENT: Soraida Agarwal    Supervising Physician: Gabriel Noel M.D.    CHIEF COMPLAINT: neck pain    HISTORY:  Soraida Agarwal is a 23 y.o. female here for initial evaluation of low back and bilateral leg pain (Back - 3, Leg - 3). The pain has been present for years, worsening 5 months ago. Pt says she does regular sports/gymnastics The patient describes the pain as aching and randomly shooting down both legs.  The pain is worse with activity and improved by rest. There is positive associated numbness and tingling. There is negative subjective weakness. Prior treatments have included PT with continued home exercises for 8 weeks in the last 3 months, but no ESIs or surgery. It is the [employer] spine conditioning program. Exercises include head rolls, kneeling back extension, sitting rotation stretch, modified seated side straddle, knee to chest, bird dog, plank, modified seated plank, hip bridges, abdominal bracing, and abdominal crunch. Pt completes each exercise daily for 1 hour with worsening of pain.    The patient also has complaints of neck pain (Neck - 3, Arm - 0).  The pain has been present for years. The patient describes the pain as aching and radiating up causing headaches. The pain is worse with activity and improved by rest. There is negative associated numbness and tingling. There is negative subjective weakness. Prior treatments have included home exercises, but no ESIs or surgery. It is the [employer] cervical exercise program. Exercises include walking erectly with neutral head position, supine neutral head position, supine retraction, sitting or standing neck retraction, posture training, forward/backward/sideward isometric strengthening, prone head lifts, supine head lifts, scapular retraction, and neck rotation. Pt completes each exercise daily for 1 hour with worsening of pain.    The patient denies myelopathic symptoms such as handwriting changes or difficulty  with buttons/coins/keys. Denies perineal paresthesias, bowel/bladder dysfunction.    PAST MEDICAL/SURGICAL HISTORY:  History reviewed. No pertinent past medical history.  History reviewed. No pertinent surgical history.    Medications:   Current Outpatient Medications on File Prior to Visit   Medication Sig Dispense Refill    EScitalopram oxalate (LEXAPRO) 10 MG tablet Take 1 tablet (10 mg total) by mouth once daily. 90 tablet 3    EScitalopram oxalate (LEXAPRO) 5 MG Tab Take in addition to 10 mg tablet the week before cycle begins. 30 tablet 11    methylphenidate HCl 18 MG CR tablet Take 1 tablet (18 mg total) by mouth every morning. 30 tablet 0     No current facility-administered medications on file prior to visit.       Social History:   Social History     Socioeconomic History    Marital status: Unknown   Tobacco Use    Smoking status: Never    Smokeless tobacco: Never     Social Determinants of Health     Financial Resource Strain: Low Risk  (6/17/2024)    Overall Financial Resource Strain (CARDIA)     Difficulty of Paying Living Expenses: Not very hard   Food Insecurity: No Food Insecurity (6/17/2024)    Hunger Vital Sign     Worried About Running Out of Food in the Last Year: Never true     Ran Out of Food in the Last Year: Never true   Physical Activity: Sufficiently Active (6/17/2024)    Exercise Vital Sign     Days of Exercise per Week: 4 days     Minutes of Exercise per Session: 60 min   Stress: Stress Concern Present (6/17/2024)    [employer] - Occupational Stress Questionnaire     Feeling of Stress : Rather much   Housing Stability: Unknown (6/17/2024)    Housing Stability Vital Sign     Unable to Pay for Housing in the Last Year: No       REVIEW OF SYSTEMS:  Constitution: Negative. Negative for chills, fever and night sweats.   Cardiovascular: Negative for chest pain and syncope.   Respiratory: Negative for cough and shortness of breath.   Gastrointestinal: See HPI.  "Negative for nausea/vomiting. Negative for abdominal pain.  Genitourinary: See HPI. Negative for discoloration or dysuria.  Skin: Negative for dry skin, itching and rash.   Hematologic/Lymphatic: Negative for bleeding problem. Does not bruise/bleed easily.   Musculoskeletal: Negative for falls and muscle weakness.   Neurological: See HPI. No seizures.   Endocrine: Negative for polydipsia, polyphagia and polyuria.   Allergic/Immunologic: Negative for hives and persistent infections.     EXAM:  Ht 5' 5" (1.651 m)   Wt 64.9 kg (143 lb 3 oz)   BMI 23.83 kg/m²     PHYSICAL EXAMINATION:    General: The patient is a very pleasant 23 y.o. female in no apparent distress, the patient is oriented to person, place and time.  Psych: Normal mood and affect  HEENT: Vision grossly intact, hearing intact to the spoken word.  Lungs: Respirations unlabored.  Gait: Normal station and gait, no difficulty with toe or heel walk.   Skin: Cervical skin and dorsal lumbar skin negative for rashes, lesions, hairy patches and surgical scars.    Range of motion: Cervical and lumbar range of motion is acceptable. There is negative tenderness to palpation of the paracervical muscles.  There is negative lumbar tenderness to palpation.  Spinal Balance: Global saggital and coronal spinal balance acceptable, no significant for scoliosis and kyphosis.  Musculoskeletal: No pain with the range of motion of the bilateral shoulders and elbows. Normal bulk and contour of the bilateral hands.  No pain with the range of motion of the bilateral hips. Mild bilateral trochanteric tenderness to palpation.  Vascular: Bilateral upper and lower extremities warm and well perfused, radial pulses 2+ bilaterally, dorsalis pedis pulses 2+ bilaterally.  Neurological: Normal strength and tone in all major motor groups in the bilateral upper and lower extremities. Normal sensation to light touch in the C5-T1 and L2-S1 dermatomes bilaterally.  Deep tendon reflexes " symmetric 2+ in the bilateral upper and lower extremities.  Negative Inverted Radial Reflex and Torrez's bilaterally. Negative Babinski bilaterally. Negative straight leg raise bilaterally.     IMAGING:   Today I personally reviewed AP, Lat and Flex/Ex  upright C-spine films that demonstrate minimal degenerative changes.    AP, Lat and Flex/Ex upright lumbar spine films demonstrate minimal degenerative changes.    MRI lumbar demonstrates central disc bulge at L4-5 resulting in mild stenosis    Body mass index is 23.83 kg/m².    Hemoglobin A1C   Date Value Ref Range Status   08/24/2023 5.0 4.0 - 5.6 % Final     Comment:     ADA Screening Guidelines:  5.7-6.4%  Consistent with prediabetes  >or=6.5%  Consistent with diabetes    High levels of fetal hemoglobin interfere with the HbA1C  assay. Heterozygous hemoglobin variants (HbS, HgC, etc)do  not significantly interfere with this assay.   However, presence of multiple variants may affect accuracy.           ASSESSMENT/PLAN:    Soraida was seen today for neck pain.    Diagnoses and all orders for this visit:    DDD (degenerative disc disease), cervical  -     MRI Cervical Spine Without Contrast; Future    Lumbar back pain  -     Ambulatory referral/consult to Orthopedics        Today we discussed at length all of the different treatment options including anti-inflammatories, acetaminophen, rest, ice, heat, physical therapy including strengthening and stretching exercises, home exercises, ROM, aerobic conditioning, aqua therapy, other modalities including ultrasound, massage, and dry needling, epidural steroid injections and finally surgical intervention.      Pt presents with chronic neck and low back pain with lumbar radiculopathy. Failure of conservative rx. Will obtain cervical MRI to further evaluate and call with results. Will order L4-5 ART with pain management.

## 2024-07-05 NOTE — PRE-PROCEDURE INSTRUCTIONS
Unable to reach pt via phone.  Left voicemail with arrival time also informing pt of need for responsible  accompaniment and instructing pt to follow pre-procedure instructions provided via [facility] portal.  The following message was sent to pt's portal.      Dear Soraida ,     You are scheduled for a procedure with Dr. Amor Vivar on 7/9/2024.       Your scheduled arrival time is 10:20 am.  This arrival time is roughly 1 hour before your anticipated procedure time to allow sufficient time for pre-op..       You will need to change into a gown for this procedure.  This procedure will take place at the [facility] at the corner of [facility] and [facility].  [facility] [place] [facility] to [facility].  The address is:     [address] [place] [address].  [place] [id number]     After entering the building, you will proceed to the second floor where you can check in with registration. You should take any medications that you routinely take for blood pressure, heart medications, thyroid, cholesterol, etc.      The fasting restrictions are dependent on whether or not you are receiving sedation.  Sedation is not available for all procedures.      Your fasting instructions are as follow:  Oral Sedation. You do not need to fast before this procedure.  You can eat and drink like normal.  You CANNOT drive yourself and must have a .     If you are on blood thinners, you need to follow the anticoagulation instructions that had been discussed previously.  You should only stop the blood thinners if it was approved by your primary care physician or your cardiologist.  In the event that you are not able to stop your blood thinners, a blood thinner was not listed on your medication list, or we were not able to get clearance from your cardiologist, then the procedure may have to be postponed/canceled.      IF you were told to stop your blood thinners, this is how long you should  generally hold some of the more common ones.  Remember that stopping blood thinners is only necessary for certain procedures. If you are unsure of your instructions, please call us.   Aspirin - 5 days  Plavix/Clopidogrel - 7 days  Warfarin / Coumadin - 5 days  Eliquis - 3 days  Pradaxa/Dabigatran - 4 days  Xarelto/Rivaroxaban - 3 days     If you are a diabetic, do not take your medication if you will be fasting, but bring it with you. Please plan on being here for roughly 2-3 hours.     Please call us if you have been sick (running fever, having any flu-like symptoms) or have been taking antibiotics in the past 2 weeks or had any outpatient procedures other than with us (colonoscopy, endoscopy, OBGYN, dental, etc.).      If you have been previously COVID positive, you will need to hold off on your procedure until you are symptom free for 10 days. If you did not have any symptoms, you can have your procedure 10 days from your positive test result.          On the morning of your procedure:  *HOLD ALL VITAMINS, MINERALS, HERBS (INCLUDING HERBAL TEAS) AND SUPPLEMENTS  *SHOWER WITH ANTIBACTERIAL SOAP (EX. DIAL) NIGHT BEFORE AND MORNING OF PROCEDURE  *DO NOT APPLY ANY LOTIONS, OILS, POWDERS, PERFUME/COLOGNE, OINTMENTS, GELS, CREAMS, MAKEUP OR DEODORANT TO YOUR SKIN MORNING OF PROCEDURE  *LEAVE JEWELRY AND ANY VALUABLES AT HOME  *WEAR LOOSE COMFORTABLE CLOTHING (PREFERABLY A BUTTON UP SHIRT)     Please reply to this message as receipt of delivery        Thank you,  [facility]  [place], LPN [facility]  Pre-Admit

## 2024-07-09 ENCOUNTER — HOSPITAL ENCOUNTER (OUTPATIENT)
Facility: HOSPITAL | Age: 24
Discharge: HOME | End: 2024-07-09
Attending: STUDENT IN AN ORGANIZED HEALTH CARE EDUCATION/TRAINING PROGRAM | Admitting: STUDENT IN AN ORGANIZED HEALTH CARE EDUCATION/TRAINING PROGRAM
Payer: OTHER GOVERNMENT

## 2024-07-09 VITALS
HEIGHT: 66 IN | BODY MASS INDEX: 23.3 KG/M2 | OXYGEN SATURATION: 99 % | WEIGHT: 145 LBS | TEMPERATURE: 99 F | RESPIRATION RATE: 16 BRPM | DIASTOLIC BLOOD PRESSURE: 60 MMHG | SYSTOLIC BLOOD PRESSURE: 108 MMHG | HEART RATE: 70 BPM

## 2024-07-09 DIAGNOSIS — M54.16 LUMBAR RADICULOPATHY: Primary | ICD-10-CM

## 2024-07-09 LAB
B-HCG UR QL: NEGATIVE
CTP QC/QA: YES

## 2024-07-09 PROCEDURE — 25500020 PHARM REV CODE 255: Performed by: STUDENT IN AN ORGANIZED HEALTH CARE EDUCATION/TRAINING PROGRAM

## 2024-07-09 PROCEDURE — 62323 NJX INTERLAMINAR LMBR/SAC: CPT | Mod: ,,, | Performed by: STUDENT IN AN ORGANIZED HEALTH CARE EDUCATION/TRAINING PROGRAM

## 2024-07-09 PROCEDURE — 81025 URINE PREGNANCY TEST: CPT | Performed by: STUDENT IN AN ORGANIZED HEALTH CARE EDUCATION/TRAINING PROGRAM

## 2024-07-09 PROCEDURE — 63600175 PHARM REV CODE 636 W HCPCS: Performed by: STUDENT IN AN ORGANIZED HEALTH CARE EDUCATION/TRAINING PROGRAM

## 2024-07-09 PROCEDURE — 25000003 PHARM REV CODE 250: Performed by: STUDENT IN AN ORGANIZED HEALTH CARE EDUCATION/TRAINING PROGRAM

## 2024-07-09 PROCEDURE — 62323 NJX INTERLAMINAR LMBR/SAC: CPT | Performed by: STUDENT IN AN ORGANIZED HEALTH CARE EDUCATION/TRAINING PROGRAM

## 2024-07-09 RX ORDER — ALPRAZOLAM 0.5 MG/1
1 TABLET, ORALLY DISINTEGRATING ORAL ONCE AS NEEDED
Status: COMPLETED | OUTPATIENT
Start: 2024-07-09 | End: 2024-07-09

## 2024-07-09 RX ORDER — LIDOCAINE HYDROCHLORIDE 20 MG/ML
INJECTION, SOLUTION EPIDURAL; INFILTRATION; INTRACAUDAL; PERINEURAL
Status: DISCONTINUED | OUTPATIENT
Start: 2024-07-09 | End: 2024-07-09 | Stop reason: HOSPADM

## 2024-07-09 RX ORDER — DEXAMETHASONE SODIUM PHOSPHATE 10 MG/ML
INJECTION INTRAMUSCULAR; INTRAVENOUS
Status: DISCONTINUED | OUTPATIENT
Start: 2024-07-09 | End: 2024-07-09 | Stop reason: HOSPADM

## 2024-07-09 RX ADMIN — ALPRAZOLAM 1 MG: 0.5 TABLET, ORALLY DISINTEGRATING ORAL at 11:07

## 2024-07-09 NOTE — DISCHARGE INSTRUCTIONS
[facility]  [employer] service # [phone]    POST-PROCEDURE INSTRUCTIONS:    Today you had an injection that included a steroid medications.  The steroid may or may not have been mixed with a local anesthetic when it was injected.   If the injection was in the neck, you may feel some pressure, numbness, or slight weakness in the arm after the procedure for a short period of time (this is a normal response), if this persists for longer than 1 day please contact our office or go to the emergency room.  If the injection was in the low back, you may feel some pressure, numbness, or slight weakness in the leg after the procedure for a short period of time (this is a normal response), if this persists for longer than 1 day please contact our office or go to the emergency room.  You may get side effects from the steroid.  This is not uncommon.  Symptoms include: elevated blood sugar, elevated blood pressure, headache, flushing, nausea, insomnia.  These symptoms are transient and will resolve within 1-3 days.  If symptoms last longer than this please contact our office or head to the emergency room.  Steroid medications can take anywhere from 3-14 days to take effect (rarely longer).  You may notice that your pain worsens for a short period of time after the injection, this would not be unusual due to the pressure and trauma from the needle.    If you do not have a follow up appointment scheduled, please contact my office (or the office of the physician who referred you for the procedure) to get a post-procedure follow up scheduled 2-4 weeks after the procedure.  This can be done as a virtual visit if that is more convenient for you.      What you need to do:    Keep a record of your response to the injection you had today.    How much relief did you get?   When did the relief start and how long did it last?  Were you able to decrease the use of any of your pain  medications?  Were you able to increase your level of activity?  How long did the relief last?    What to watch out for:    If you experience any of the following symptoms after your procedure, please notify the messaging service immediately (see above for contact information):   fever (increased oral temperature)   bleeding or swelling at the injection site,    drainage, rash or redness at the injection site    possible signs of infection    increased pain at the injection site   worsening of your usual pain   severe headache   new or worsening numbness    new arm and/or leg weakness, or    changes in bowel and/or bladder function: urinating or defecating on yourself and not knowing that you did it.    PLEASE FOLLOW ALL INSTRUCTIONS CAREFULLY     Do not engage in strenuous activity (e.g., lifting or pushing heavy objects or repeated bending) for 24 hours.     Do not take a bath, swim or use Jacuzzi for 24 hours after procedure. (A shower is fine).   Remove any Band-Aids when you get home.    Use cold/ice, as needed for comfort.  We recommend the use of cold therapy alternating on for 20 minutes, off for 20 minutes.    Do not apply direct heat (heating pad or heat packs) to the injection site for 24 hours.     Resume your usual medications, unless instructed otherwise by your Pain Physician.     If you are on warfarin (Coumadin) or other blood thinner, resume this medication as instructed by your prescribing Physician.    IF AT ANY POINT YOU ARE VERY CONCERNED ABOUT YOUR SYMPTOMS, PLEASE GO TO THE EMERGENCY ROOM.    If you develop worsening pain, weakness, numbness, lose bowel or bladder control (i.e., having an accident where you did not even know you had to go to the bathroom and suddenly noticed you soiled yourself), saddle anesthesia (a loss of sensation restricted to the area of the buttocks, anus and between the legs -- i.e., those parts of your body that would touch a saddle if you were sitting on one) you  need to go immediately to the emergency department for evaluation and treatment.    ----------------------------------------------------------------------------------------------------------------------------------------------------------------  If you received Sedation please read the following instructions:  POST SEDATION INSTRUCTIONS    Today you received intravenous medication (also known as sedation) that was used to help you relax and/or decrease discomfort during your procedure. This medication will be acting in your body for the next 24 hours, so you might feel a little tired or sleepy. This feeling will slowly wear off.   Common side effects associated with these medications include: drowsiness, dizziness, sleepiness, confusion, feeling excited, difficulty remembering things, lack of steadiness with walking or balance, loss of fine muscle control, slowed reflexes, difficulty focusing, and blurred vision.  Some over-the-counter and prescription medications (e.g., muscle relaxants, opioids, mood-altering medications, sedatives/hypnotics, antihistamines) can interact with the intravenous medication you received and cause an increased risk of the side effects listed above in addition to other potentially life threatening side effects. Use extreme caution if you are taking such medications, and consult with your Pain Physician or prescribing physician if you have any questions.  For the next 12-24 hours:    DO NOT--Drive a car, operate machinery or power tools   DO NOT--Drink any alcoholic beverages (not even beer), they may dangerously increase the risk of side effects.    DO NOT--Make any important legal or business decisions or sign important documents.  We advise you to have someone to assist you at home. Move slowly and carefully. Do not make sudden changes in position. Be aware of dizziness or light-headedness and move accordingly.   If you seek medical treatment within 24 hours, let the nurse or doctor  caring for you know that you have received the above medications. If you have any questions or concerns related to your sedation or treatment today please contact us.

## 2024-07-09 NOTE — DISCHARGE SUMMARY
[facility]  Discharge Note  Short Stay    Procedure(s) (LRB):  L4-5 ART (N/A)      OUTCOME: Patient tolerated treatment/procedure well without complication and is now ready for discharge.    DISPOSITION: Home or Self Care    FINAL DIAGNOSIS:  <principal problem not specified>    FOLLOWUP: In clinic    DISCHARGE INSTRUCTIONS:  No discharge procedures on file.     TIME SPENT ON DISCHARGE: 10 minutes

## 2024-07-09 NOTE — OP NOTE
"PROCEDURE:  LUMBAR L4-5 INTERLAMINAR EPIDURAL STEROID INJECTION    Patient Name: Soraida Agarwal  MRN: [id number]  DATE OF PROCEDURE: 07/09/2024    INJECTION # 1    DIAGNOSIS: Lumbar Radiculopathy  CPT CODE: 42908      POSTPROCEDURE DIAGNOSIS: Same    PHYSICIAN: Amor Vivar DO  NEEDLE TYPE: - 20G 3.5" Touhy Needle  MEDICATIONS INJECTED: 8cc mixture of 7cc Normal Saline + 10mg Dexamethasone (10mg/ml)  CONTRAST: Omni 300  LOSS OF RESISTANCE DEPTH: 7 cm    Sedation Medications - Oral Sedation     Estimated Blood Loss - <2ml  Drains: None  Specimens Removed: None  Urine Output - Not Measured  Complications: None  Outcome: Good    Informed Consent:  The patient's condition and proposed procedures, risks, and alternatives were discussed with the patient or responsible party.  The patient's / responsible party's questions were answered.   The patient / responsible party appeared to understand and chose to proceed.  Informed consent was obtained.  After obtaining written consent, an IV hep lock was placed. (See nurses notes for details).     Procedure in Detail:  The patient was taken back to the OR suite and placed in a prone position. The skin overlying the injection site was prepped and draped in an aseptic fashion. The target injection site (see above) was identified with fluoroscopy and marked.     Procedural Pause:  A procedural pause verifying correct patient, medical record number, allergies, medications to be administered, current vital signs, and surgical site was performed immediately prior to beginning the procedure.    The skin and subcutaneous tissue overlying the target site of injection for the L4-5 epidural steroid injection was/were anesthetized using 4 mL of 2% lidocaine with a 25-gauge, 1½-inch needle.  The above noted Tuohy needle was advanced under fluoroscopic guidance towards the epidural space. Lateral fluoroscopic imaging was used to confirm depth. The epidural space was identified using " a loss of resistance to saline technique. (See above for loss of resistance depth). A microbore extension tubing was attached to the needle to minimize any movement of the needle during injection or syringe change.  After negative aspiration for heme or CSF, 1ml of contrast was injected to confirm placement and no intrathecal or vascular spread.  After repeat negative aspiration for heme or CSF, the above noted steroid solution was slowly injected in increments. The needle was then retracted approximately retirement and the needle track was flushed with 0.5 ml of Lidocaine 2% to clear the needle prior to removal. The Tuohy needle was then removed.     The heart rate, pulse oximetry, and blood pressure were continuously monitored throughout the procedure.  The prpocedure was well tolerated. She was carefully escorted to the recovery room in stable condition. Patient was monitored by RN for recovery period.  The patient will be contacted in the next few days to determine extent of relief.  Patient was given post procedure and discharge instructions to follow at home.  The patient was discharged in a stable condition.    Note Electronically Signed By:  Amor Medina

## 2024-07-12 ENCOUNTER — HOSPITAL ENCOUNTER (OUTPATIENT)
Dept: RADIOLOGY | Facility: HOSPITAL | Age: 24
Discharge: HOME | End: 2024-07-12
Attending: ORTHOPAEDIC SURGERY
Payer: OTHER GOVERNMENT

## 2024-07-12 DIAGNOSIS — M50.30 DDD (DEGENERATIVE DISC DISEASE), CERVICAL: ICD-10-CM

## 2024-07-12 PROCEDURE — 72141 MRI NECK SPINE W/O DYE: CPT | Mod: TC

## 2024-07-12 PROCEDURE — 72141 MRI NECK SPINE W/O DYE: CPT | Mod: 26,,, | Performed by: RADIOLOGY

## 2024-07-16 ENCOUNTER — CLINICAL SUPPORT (OUTPATIENT)
Dept: REHABILITATION | Facility: HOSPITAL | Age: 24
End: 2024-07-16
Payer: OTHER GOVERNMENT

## 2024-07-16 DIAGNOSIS — M54.50 LUMBAR BACK PAIN: ICD-10-CM

## 2024-07-16 DIAGNOSIS — R29.898 DECREASED STRENGTH OF LOWER EXTREMITY: Primary | ICD-10-CM

## 2024-07-16 DIAGNOSIS — M50.30 DDD (DEGENERATIVE DISC DISEASE), CERVICAL: ICD-10-CM

## 2024-07-16 PROCEDURE — 97112 NEUROMUSCULAR REEDUCATION: CPT

## 2024-07-16 PROCEDURE — 97161 PT EVAL LOW COMPLEX 20 MIN: CPT

## 2024-07-23 ENCOUNTER — CLINICAL SUPPORT (OUTPATIENT)
Dept: REHABILITATION | Facility: HOSPITAL | Age: 24
End: 2024-07-23
Payer: OTHER GOVERNMENT

## 2024-07-23 DIAGNOSIS — R29.898 DECREASED STRENGTH OF LOWER EXTREMITY: Primary | ICD-10-CM

## 2024-07-23 PROCEDURE — 97112 NEUROMUSCULAR REEDUCATION: CPT

## 2024-07-25 ENCOUNTER — CLINICAL SUPPORT (OUTPATIENT)
Dept: REHABILITATION | Facility: HOSPITAL | Age: 24
End: 2024-07-25
Payer: OTHER GOVERNMENT

## 2024-07-25 DIAGNOSIS — F41.9 ANXIETY DISORDER, UNSPECIFIED TYPE: Primary | ICD-10-CM

## 2024-07-25 DIAGNOSIS — R29.898 DECREASED STRENGTH OF LOWER EXTREMITY: Primary | ICD-10-CM

## 2024-07-25 DIAGNOSIS — F90.8 ATTENTION DEFICIT HYPERACTIVITY DISORDER (ADHD), OTHER TYPE: ICD-10-CM

## 2024-07-25 PROCEDURE — 97112 NEUROMUSCULAR REEDUCATION: CPT

## 2024-07-25 NOTE — TELEPHONE ENCOUNTER
No care due was identified.  Health [facility] Embedded Care Due Messages. Reference number: [id number].   7/25/2024 4:50:40 PM CDT

## 2024-07-25 NOTE — PROGRESS NOTES
"[facility]   Physical Therapy Treatment Note     Name: Soraida Agarwal  Clinic Number: [id number]    Therapy Diagnosis:   Encounter Diagnosis   Name Primary?    Decreased strength of lower extremity Yes     Physician: Jennifer Rolon,*    Visit Date: 7/23/2024  Physician Orders: PT Eval and Treat   Medical Diagnosis from Referral: Lumbar back pain [M54.50], DDD (degenerative disc disease), cervical [M50.30]   Evaluation Date: 7/16/2024  Authorization Period Expiration: 12/31/2024  Plan of Care Expiration: 10/20/2024  Progress Note Due: 8/15/2024  Visit # / Visits authorized: 1/15   FOTO: 1/3    FOTO first follow up:   FOTO second follow up:     Time In: 9:04 am   Time Out: 9:56 am   Total Billable Time: 52 minutes (30)    SUBJECTIVE     Pt reports: Feels about the same, has been doing the exercises.  She was compliant with home exercise program.  Response to previous treatment: Independent in HEP   Functional change: Ongoing     Pain: 2/10  Location: bilateral back      OBJECTIVE     Objective Measures updated at progress report unless specified.     Treatment     Soraida received the treatments listed below:      therapeutic exercises to develop strength, endurance, ROM, flexibility, posture, and core stabilization for 03 minutes including:    Sidelying open books 1 x 15 reps each     manual therapy techniques: Joint mobilizations and Soft tissue Mobilization were applied to the: Hip for 06 minutes, including:    Long axis hip distraction grade II-III    neuromuscular re-education activities to improve: Balance, Coordination, Kinesthetic, Sense, Proprioception, and Posture for 43 minutes. The following activities were included:    TA activation with BP cuff (40-50 mmHg) 2 x 10 x 5" holds   TA activation with BP cuff and BKFO (40-50 mmHg) 1 x 10 reps each   TA activation with GreenTB in hooklying 2 x 10 x 3" holds   Sidelying clamshells with RedTB 2 x 12 x 3" holds   Standing " "paloff press GreenTB 2 x 10 reps   Sidelying hip ER 2 x 10 x 3" holds     therapeutic activities to improve functional performance for 00 minutes, including:      Patient Education and Home Exercises     Home Exercises Provided and Patient Education Provided     Education provided:   - Continue with HEP     Written Home Exercises Provided: Patient instructed to cont prior HEP. Exercises were reviewed and Soraida was able to demonstrate them prior to the end of the session.  Soraida demonstrated good  understanding of the education provided. See EMR under Patient Instructions for exercises provided during therapy sessions    ASSESSMENT     Soraida tolerated first physical therapy session well. She was further challenged with core stability with utilization of blood pressure cuff for biofeedback. She continues to be challenged with hip/glute strengthening as well with significant fatigue following hip intrinsic strengthening. Will continue to benefit from further strengthening and stability work in follow-ups. Will continue to monitor and progress as able.     Soraida Is progressing well towards her goals.   Pt prognosis is Good.     Pt will continue to benefit from skilled outpatient physical therapy to address the deficits listed in the problem list box on initial evaluation, provide pt/family education and to maximize pt's level of independence in the home and community environment.     Pt's spiritual, cultural and educational needs considered and pt agreeable to plan of care and goals.     Anticipated barriers to physical therapy: Scheduling, chronicity of symptoms     Goals:   Short Term Goals: 4 weeks (Progressing, not met)  Patient will be independent in initial HEP to help supplement PT.   Patient will report no pain with lumbar range of motion to help with ADLs.   Patient will improve hip extension to 10 degrees to help with gait mechanics.      Long Term Goals: 8-12 weeks (Progressing, not met)  Patient " will be independent in updated HEPT to help supplement PT and maintain gains made in PT.   Patient will improve FOTO score to >/= predicted (69) to show improvement in condition.   Patient will improve hip strength by 1/2 MMT to help with return to gymnastics.   Patient goal: Will be able to return to gymnastics pain free.    PLAN   Plan of care Certification: 7/16/2024 to 10/20/2024.     Continue with current plan of care with emphasis on core stability.    Melanie Arora, PT, DPT, OCS

## 2024-07-25 NOTE — PLAN OF CARE
[facility]   Physical Therapy Initial Evaluation      Name: Soraida Agarwal  Clinic Number: [id number]    Therapy Diagnosis:   Encounter Diagnoses   Name Primary?    Lumbar back pain     DDD (degenerative disc disease), cervical     Decreased strength of lower extremity Yes     Physician: Jennifer Rolon,*     Physician Orders: PT Eval and Treat   Medical Diagnosis from Referral: Lumbar back pain [M54.50], DDD (degenerative disc disease), cervical [M50.30]   Evaluation Date: 7/16/2024  Authorization Period Expiration: 12/31/2024  Plan of Care Expiration: 10/20/2024  Progress Note Due: 8/15/2024  Visit # / Visits authorized: 1/1   FOTO: 1/3    Precautions: Standard     Time In: 10:06 am   Time Out: 10:58 am   Total Appointment Time (timed & untimed codes): 52 minutes    Subjective     Date of onset: January-February 2024    History of current condition - Soraida reports: She participates in club gymnastics and around late January to early February her back started bothering her more. She was living in [place] at the time and unable to really get fully checked out and an MRI until she returned home. She pretty much stopped doing most things and anything that bothered her back. She had an MRI and was told she had herniated discs. She reports her muscles feel tight and hurt when she bends certain directions. She gets discomfort sleeping and carrying anything. She reports her hips will bother her more as well and that jumping, landing and hanging on the bars hurt. She has had sciatica before but no symptoms like that currently. She will at times get some tingling in her toes and some numbness down the back of her legs mainly on the right side. She describes the pain as more dull and achy currently. She also has some discomfort in her neck that has bothered her for years and was told she has some bone spurs. When her neck bothers her she gets bad headaches as well. She would like to be  able to return to gymnastics which she participates in club gymnastics and would like to continue to participate for as long as she can. She had a steroid injection last week.   She denies any other pertinent past medical history other than shin splints.     Imaging:   MRI Lumbar Spine (6/27/2024):  Impression:  Central disc extrusion at L4-5 resulting in mild canal stenosis.  No nerve root impingement.    MRI Cervical Spine (7/12/2024):  Impression:  No significant central or foraminal narrowing.  No focal protrusion or extrusion of disc material.  Minimal early posterior marginal osteophytic disc spur complex C3-C4 through C5-C6.     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    Current Level of Function: Increased pain in her back with difficulty with prolonged positions, bending, and participation in recreational activities.    Pain:  Current 3/10, worst 6/10, best 2/10   Location: bilateral back    Description: Aching, Dull, Tingling, and Numb  Aggravating Factors: Standing, Bending, Lifting, and recreational activities   Easing Factors: rest    Patients goals: To be able to continue doing gymnastics without immense amounts of pain.       Medical History:   No past medical history on file.    Surgical History:   Soraida Agarwal  has a past surgical history that includes Epidural steroid injection into lumbar spine (N/A, 7/9/2024).    Medications:   Soraida has a current medication list which includes the following prescription(s): escitalopram oxalate, escitalopram oxalate, and methylphenidate hcl.    Allergies:   Review of patient's allergies indicates:   Allergen Reactions    Pollen extracts Other (See Comments)        Objective      Observation: Patient presents with an overall pleasant general affect who does not appear to be in any  acute distress.     Posture: Patient presents with static posture of flattened lumbar spine, rounded shoulders, and hinge around mid-lower thoracic.     Gait: Patient ambulates independently into clinic with no assistance and displays good eduarda, decreased hip extension and slight hip drop.     Dermatomes:    Right Left    L2 Intact Intact   L3 Intact Intact   L4 Intact Intact   L5 Intact Intact   S1 Intact Intact   S2 Intact Intact     Myotomes:    Right  Left    L2 4/5* 4/5*   L3 4/5* 5/5*   L4 5/5 5/5   L5 5/5 5/5   S1 5/5 5/5   S2 5/5 5/5   *Patient reported back pain with L2 and L3 myotome assessment.     Range of Motion:  Lumbar   Percentage Pain   Flexion 75% No Pain   Extension 50% Pain   Right Sidebend  90% No Pain   Left Sidebend 60% Pain   Right Rotation 75% No Pain   Left Rotation 90% No Pain   Quadrant     Flex, R SB, R Rot N/A No Pain   Flex, L SB, L Rot N/A No Pain   Ext, R SB, R Rot N/A No Pain   Ext, L SB, L Rot N/A No Pain   *Noticeable increased hip flexion with lumbar flexion assessment, limited segmental mobility into flexion of lumbar spine.     Hip   Right  Left    Flexion 110 degrees  110 degrees    Extension 5 degrees*  5 degrees*    Internal Rotation 30 degrees  30 degrees    External Rotation 50 degrees  50 degrees    *Patient reported pain with hip extension in low back     Lower Extremity Strength (MMT):   Right Left   Hip Flexion 3+/5 4-/5   Hip Abduction 4-/5 4-/5   Hip Extension 3+/5 3+/5   Knee Flexion 4/5 4/5   Knee Extension 4+/5 4+/5   Dorsiflexion 4+/5 4+/5   Plantarflexion 4/5 4/5     Joint Mobility: decreased upper thoracic mobility with PA, increased lower lumbar PA, and decreased PA hip mobility.     Palpation: Tenderness to palpation and increased pressure erector spinae muscles and sacral mobs assessment.     Flexibility: Further assessment at follow-up sessions.      Special Tests:  Lumbar  - Prone Instability Test: (+)  - SLR Test: (-)    Sacroiliac   - SI Distraction:  "NT  - SI Compression: NT  - SI Thigh Thrust: (+)  - Sacral Thrust: (+)  - Flick Test: (+)   - Supine to Sit (ALS/PLS): Posteriorly rotated innominate on R     Functional Tests:  - DL Squat: Able to perform past parallel, no pain, increased hip external rotation.   - SL Squat / Step Down: Increased valgus noted right compared to left.    Intake Outcome Measure for FOTO Lumbar Survey    Therapist reviewed FOTO scores for Soraida Agarwal on 7/16/2024.   FOTO documents entered into [employer] - see Media section.    Intake Score: 54%          Treatment     Total Treatment time (time-based codes) separate from Evaluation: 25 minutes     Soraida received the treatments listed below:      therapeutic exercises to develop strength, endurance, ROM, flexibility, posture, and core stabilization for 00 minutes including:      manual therapy techniques: Joint mobilizations and Soft tissue Mobilization were applied to the: lumbar, SI, Hip for 09 minutes, including:    Prone 2-person SI manipulation grade V  Long axis hip distraction grade III      neuromuscular re-education activities to improve: Balance, Coordination, Kinesthetic, Sense, Proprioception, and Posture for 16 minutes. The following activities were included:    Pt education on PT POC, Prognosis, and HEP   Seated sciatic nerve glides 1 x 15 reps   Hip AB/ADD with belt and ball 5 x 10" holds each   Posterior Pelvic Tilt 1 x 10 x 5" holds   Sidelying open books 1 x 10 reps each     therapeutic activities to improve functional performance for 00 minutes, including:      gait training to improve functional mobility and safety for 00 minutes, including:      Patient Education and Home Exercises     Education provided:   - PT POC, Prognosis, and HEP     Written Home Exercises Provided: yes. Exercises were reviewed and Soraida was able to demonstrate them prior to the end of the session.  Soraida demonstrated good  understanding of the education provided. See EMR under " Patient Instructions for exercises provided during therapy sessions.    Assessment     Soraida is a 24 y.o. female referred to outpatient Physical Therapy with a medical diagnosis of Lumbar back pain [M54.50], DDD (degenerative disc disease), cervical [M50.30] . Patient presents with decreased range of motion, decreased strength, pain, gait abnormality, and functional limitations with prolonged positions, bending, and participation in recreational activities. Patient would benefit from skilled PT interventions to address above stated deficits and improve overall functional mobility.     Patient prognosis is Good.   Patient will benefit from skilled outpatient Physical Therapy to address the deficits stated above and in the chart below, provide patient /family education, and to maximize patientt's level of independence.     Plan of care discussed with patient: Yes  Patient's spiritual, cultural and educational needs considered and patient is agreeable to the plan of care and goals as stated below:     Anticipated Barriers for therapy: Scheduling, chronicity of symptoms     Medical Necessity is demonstrated by the following  History  Co-morbidities and personal factors that may impact the plan of care [] LOW: no personal factors / co-morbidities  [x] MODERATE: 1-2 personal factors / co-morbidities  [] HIGH: 3+ personal factors / co-morbidities    Moderate / High Support Documentation: anxiety, ADHD     Examination  Body Structures and Functions, activity limitations and participation restrictions that may impact the plan of care [] LOW: addressing 1-2 elements  [] MODERATE: 3+ elements  [x] HIGH: 3+ elements (please support below)    Moderate / High Support Documentation: range of motion, strength, pain, motor control, neuromuscular re-education, posture      Clinical Presentation [x] LOW: stable  [] MODERATE: Evolving  [] HIGH: Unstable     Decision Making/ Complexity Score: low       Goals:  Short Term Goals: 4  weeks   Patient will be independent in initial HEP to help supplement PT.   Patient will report no pain with lumbar range of motion to help with ADLs.   Patient will improve hip extension to 10 degrees to help with gait mechanics.     Long Term Goals: 8-12 weeks   Patient will be independent in updated HEPT to help supplement PT and maintain gains made in PT.   Patient will improve FOTO score to >/= predicted (69) to show improvement in condition.   Patient will improve hip strength by 1/2 MMT to help with return to gymnastics.   Patient goal: Will be able to return to gymnastics pain free.  Plan     Plan of care Certification: 7/16/2024 to 10/20/2024.    Outpatient Physical Therapy 1-2 times weekly for 12 weeks to include the following interventions: Electrical Stimulation , Gait Training, Manual Therapy, Moist Heat/ Ice, Neuromuscular Re-ed, Patient Education, Self Care, Therapeutic Activities, and Therapeutic Exercise.     Melanie Arora, PT, DPT, OCS

## 2024-07-25 NOTE — PROGRESS NOTES
"[facility]   Physical Therapy Treatment Note     Name: Soraida Agarwal  Clinic Number: [id number]    Therapy Diagnosis:   Encounter Diagnosis   Name Primary?    Decreased strength of lower extremity Yes     Physician: Jennifer Rolon,*    Visit Date: 7/25/2024  Physician Orders: PT Eval and Treat   Medical Diagnosis from Referral: Lumbar back pain [M54.50], DDD (degenerative disc disease), cervical [M50.30]   Evaluation Date: 7/16/2024  Authorization Period Expiration: 12/31/2024  Plan of Care Expiration: 10/20/2024  Progress Note Due: 8/15/2024  Visit # / Visits authorized: 2/15  FOTO: 1/3     Precautions: Standard      Time In: 0500 pm  Time Out: 0600 pm  Total Billable Time: 60 min     SUBJECTIVE     Pt reports: her back is feeling okay but her neck is hurting more today.  She was compliant with home exercise program.  Response to previous treatment: improved back pain   Functional change: no change    Pain: 2/10  Location: bilateral back      OBJECTIVE     Objective Measures updated at progress report unless specified.     Treatment       Soraida received the treatments listed below:      THERAPEUTIC EXERCISES to develop strength, endurance, ROM, flexibility, posture, and core stabilization for 00 minutes including:    MANUAL THERAPY TECHNIQUES including Joint mobilizations and Soft tissue Mobilization were applied to l/s for 00 minutes.    NEUROMUSCULAR RE-EDUCATION ACTIVITIES to improve Balance, Coordination, Kinesthetic, Sense, Proprioception, and Posture for 60 minutes.  The following were included:   BP cuff PPT 20-40 mmHg; 5 min  BP cuff BKFO; 2 x 10 ea  BP cuff mini march; 1 x 10 ea  SL hip pretzel 20 x 5" ea  PPT + Lat pull; GTB; 3 x 10 ea  Supine abdominal bracing + diaphragmatic breathing; 20x 5"   Modified side plank at wall + abdominal bracing; 4 x 30"     Lower abdominal leg lower to wall; 30 deg; 3 x 10 ---> add next    THERAPEUTIC ACTIVITIES to improve dynamic " and functional performance for 00 minutes including.      Patient Education and Home Exercises     Home Exercises Provided and Patient Education Provided     Education provided:   - continue HEP    Written Home Exercises Provided: Patient instructed to cont prior HEP. Exercises were reviewed and Soraida was able to demonstrate them prior to the end of the session.  Soraida demonstrated good understanding of the education provided. See EMR under Patient Instructions for exercises provided during therapy sessions    ASSESSMENT     Soraida struggled with abdominal bracing and TA activation with valsalva compensation prominent. She improved throughout session but was inconsistent by end. She was instructed to continued to practice this at home in order to allow progression. She will benefit continued core training and emphasis on posterior oblique sling where able.     Soraida is progressing well towards her goals.   Pt prognosis is Good.     Pt will continue to benefit from skilled outpatient physical therapy to address the deficits listed in the problem list box on initial evaluation, provide pt/family education and to maximize pt's level of independence in the home and community environment.     Pt's spiritual, cultural and educational needs considered and pt agreeable to plan of care and goals.     Anticipated barriers to physical therapy: Scheduling, chronicity of symptoms    Goals:   Short Term Goals: 4 weeks   Patient will be independent in initial HEP to help supplement PT. (Progressing, not met)  Patient will report no pain with lumbar range of motion to help with ADLs.  (Progressing, not met)  Patient will improve hip extension to 10 degrees to help with gait mechanics.  (Progressing, not met)     Long Term Goals: 8-12 weeks   Patient will be independent in updated HEPT to help supplement PT and maintain gains made in PT.  (Progressing, not met)  Patient will improve FOTO score to >/= predicted (69) to  show improvement in condition.  (Progressing, not met)  Patient will improve hip strength by 1/2 MMT to help with return to gymnastics.  (Progressing, not met)  Patient goal: Will be able to return to gymnastics pain free. (Progressing, not met)    PLAN     Progress core training and posterior oblique sling motor control     Michell Whitehead, PT, DPT, SCS

## 2024-07-26 ENCOUNTER — PATIENT MESSAGE (OUTPATIENT)
Dept: INTERNAL MEDICINE | Facility: CLINIC | Age: 24
End: 2024-07-26
Payer: OTHER GOVERNMENT

## 2024-07-26 RX ORDER — METHYLPHENIDATE HYDROCHLORIDE 18 MG/1
18 TABLET ORAL EVERY MORNING
Qty: 30 TABLET | Refills: 0 | Status: SHIPPED | OUTPATIENT
Start: 2024-07-26

## 2024-07-26 NOTE — TELEPHONE ENCOUNTER
Refill Routing Note   Medication(s) are not appropriate for processing by [facility] for the following reason(s):        Outside of protocol    [facility] action(s):  Route               Appointments  past 12m or future 3m with PCP    Date Provider   Last Visit   6/24/2024 Tanya Samaniego MD   Next Visit   Visit date not found Tanya Samaniego MD   ED visits in past 90 days: 0        Note composed:10:13 AM 07/26/2024

## 2024-07-30 ENCOUNTER — CLINICAL SUPPORT (OUTPATIENT)
Dept: REHABILITATION | Facility: HOSPITAL | Age: 24
End: 2024-07-30
Payer: OTHER GOVERNMENT

## 2024-07-30 DIAGNOSIS — R29.898 DECREASED STRENGTH OF LOWER EXTREMITY: Primary | ICD-10-CM

## 2024-07-30 PROCEDURE — 97112 NEUROMUSCULAR REEDUCATION: CPT

## 2024-07-30 NOTE — PROGRESS NOTES
"[facility]   Physical Therapy Treatment Note     Name: Soraida Agarwal  Clinic Number: [id number]    Therapy Diagnosis:   Encounter Diagnosis   Name Primary?    Decreased strength of lower extremity Yes     Physician: Jennifer Rolon,*    Visit Date: 7/30/2024  Physician Orders: PT Eval and Treat   Medical Diagnosis from Referral: Lumbar back pain [M54.50], DDD (degenerative disc disease), cervical [M50.30]   Evaluation Date: 7/16/2024  Authorization Period Expiration: 12/31/2024  Plan of Care Expiration: 10/20/2024  Progress Note Due: 8/15/2024  Visit # / Visits authorized: 3/15  FOTO: 1/3     Precautions: Standard      Time In: 0400 pm  Time Out: 0500 pm  Total Billable Time: 60 minutes    SUBJECTIVE     Pt reports: her back is feeling okay but her neck is hurting more today.  She was compliant with home exercise program.  Response to previous treatment: improved back pain   Functional change: no change    Pain: 2/10  Location: bilateral back      OBJECTIVE     Objective Measures updated at progress report unless specified.     Treatment       Soraida received the treatments listed below:      THERAPEUTIC EXERCISES to develop strength, endurance, ROM, flexibility, posture, and core stabilization for 00 minutes including:    MANUAL THERAPY TECHNIQUES including Joint mobilizations and Soft tissue Mobilization were applied to l/s for 00 minutes.    NEUROMUSCULAR RE-EDUCATION ACTIVITIES to improve Balance, Coordination, Kinesthetic, Sense, Proprioception, and Posture for 60 minutes.  The following were included:   Hip ER Pretzel; 20 x 5"   BP cuff PPT 20-40 mmHg; 5 min  BP cuff BKFO; 2 x 10 ea  BP cuff mini march; 1 x 10 ea  Abdominal bracing + glute set; 20 x 5"       NP Today:   PPT + Lat pull; GTB; 3 x 10 ea  Supine abdominal bracing + diaphragmatic breathing; 20x 5"   Modified side plank at wall + abdominal bracing; 4 x 30"   Lower abdominal leg lower to wall; 30 deg; 3 x 10 " ---> add next    THERAPEUTIC ACTIVITIES to improve dynamic and functional performance for 00 minutes including.      Patient Education and Home Exercises     Home Exercises Provided and Patient Education Provided     Education provided:   - continue HEP    Written Home Exercises Provided: Patient instructed to cont prior HEP. Exercises were reviewed and Soraida was able to demonstrate them prior to the end of the session.  Soraida demonstrated good understanding of the education provided. See EMR under Patient Instructions for exercises provided during therapy sessions    ASSESSMENT     Soraida demonstrated good improvement in BP cuff biofeedback drills as well as abdominal bracing drills. She struggled with addition of glute activation with notable hamstring co-contraction. She will benefit continued glute retraining and abdominal bracing.     Soraida is progressing well towards her goals.   Pt prognosis is Good.     Pt will continue to benefit from skilled outpatient physical therapy to address the deficits listed in the problem list box on initial evaluation, provide pt/family education and to maximize pt's level of independence in the home and community environment.     Pt's spiritual, cultural and educational needs considered and pt agreeable to plan of care and goals.     Anticipated barriers to physical therapy: Scheduling, chronicity of symptoms    Goals:   Short Term Goals: 4 weeks   Patient will be independent in initial HEP to help supplement PT. (Progressing, not met)  Patient will report no pain with lumbar range of motion to help with ADLs.  (Progressing, not met)  Patient will improve hip extension to 10 degrees to help with gait mechanics.  (Progressing, not met)     Long Term Goals: 8-12 weeks   Patient will be independent in updated HEPT to help supplement PT and maintain gains made in PT.  (Progressing, not met)  Patient will improve FOTO score to >/= predicted (69) to show improvement in  condition.  (Progressing, not met)  Patient will improve hip strength by 1/2 MMT to help with return to gymnastics.  (Progressing, not met)  Patient goal: Will be able to return to gymnastics pain free. (Progressing, not met)    PLAN     Progress core training and posterior oblique sling motor control     Michell Whitehead, PT, DPT, SCS

## 2024-07-31 ENCOUNTER — TELEPHONE (OUTPATIENT)
Dept: ORTHOPEDICS | Facility: CLINIC | Age: 24
End: 2024-07-31
Payer: OTHER GOVERNMENT

## 2024-08-01 ENCOUNTER — CLINICAL SUPPORT (OUTPATIENT)
Dept: REHABILITATION | Facility: HOSPITAL | Age: 24
End: 2024-08-01
Payer: OTHER GOVERNMENT

## 2024-08-01 ENCOUNTER — TELEPHONE (OUTPATIENT)
Dept: PAIN MEDICINE | Facility: CLINIC | Age: 24
End: 2024-08-01
Payer: OTHER GOVERNMENT

## 2024-08-01 DIAGNOSIS — R29.898 DECREASED STRENGTH OF LOWER EXTREMITY: Primary | ICD-10-CM

## 2024-08-01 PROCEDURE — 97112 NEUROMUSCULAR REEDUCATION: CPT

## 2024-08-01 NOTE — TELEPHONE ENCOUNTER
----- Message from Jennifer Rolon PA-C sent at 7/31/2024  3:35 PM CDT -----  Hi can we have patient fu with dr moreno/someone in [facility] pain management clinic? She had a lumbar ART with him a few weeks ago but would like to talk about treatment options for her neck. Thank you!      Contacted pt to assist in scheduling appointment with Dr. Moreno,  Pt advised me that she would return my call when she was ready to schedule appointment, she needed to check her schedule.  I verbalized comprehension.

## 2024-08-01 NOTE — PROGRESS NOTES
"[facility]   Physical Therapy Treatment Note     Name: Soraida Agarwal  Clinic Number: [id number]    Therapy Diagnosis:   Encounter Diagnosis   Name Primary?    Decreased strength of lower extremity Yes     Physician: Jennifer Rolon,*    Visit Date: 8/1/2024  Physician Orders: PT Eval and Treat   Medical Diagnosis from Referral: Lumbar back pain [M54.50], DDD (degenerative disc disease), cervical [M50.30]   Evaluation Date: 7/16/2024  Authorization Period Expiration: 12/31/2024  Plan of Care Expiration: 10/20/2024  Progress Note Due: 8/15/2024  Visit # / Visits authorized: 4/15  FOTO: 1/3     Precautions: Standard      Time In: 4:01 pm  Time Out: 4:54 pm  Total Billable Time: 53 minutes (30 min)    SUBJECTIVE     Pt reports: She is doing ok. Back is a little stiff but otherwise doing better. Still some stiffness/discomfort in her neck as well. .  She was compliant with home exercise program.  Response to previous treatment: improved back pain   Functional change: no change    Pain: 2/10  Location: bilateral back      OBJECTIVE     Objective Measures updated at progress report unless specified.     Treatment       Soraida received the treatments listed below:      THERAPEUTIC EXERCISES to develop strength, endurance, ROM, flexibility, posture, and core stabilization for 00 minutes including:    MANUAL THERAPY TECHNIQUES including Joint mobilizations and Soft tissue Mobilization were applied to l/s for 00 minutes.    NEUROMUSCULAR RE-EDUCATION ACTIVITIES to improve Balance, Coordination, Kinesthetic, Sense, Proprioception, and Posture for 53 minutes.  The following were included:     Sidelying hip ER pretzel glute isometrics 2 x 10 x 5" holds   BP cuff PPT (40-50 mmHg) 10 x 10" holds   BP cuff PPT with BKFO 2 x 10 reps each  TA activation with physioball 2 x 10 x 5" holds  TA activation with physioball with LE ext 2 x 8 reps   Quadruped flexion into rockback 2 x 10 reps " "  Quadruped mini LE extension with maintaining neutral spine 2 x 6 reps each  Prone middle trap 2 x 10 x 3" holds     NP Today:   PPT + Lat pull; GTB; 3 x 10 ea  Supine abdominal bracing + diaphragmatic breathing; 20x 5"   Modified side plank at wall + abdominal bracing; 4 x 30"   Lower abdominal leg lower to wall; 30 deg; 3 x 10 ---> add next  Hip ER Pretzel; 20 x 5"   BP cuff PPT 20-40 mmHg; 5 min  BP cuff BKFO; 2 x 10 ea  BP cuff mini march; 1 x 10 ea  Abdominal bracing + glute set; 20 x 5"     THERAPEUTIC ACTIVITIES to improve dynamic and functional performance for 00 minutes including.      Patient Education and Home Exercises     Home Exercises Provided and Patient Education Provided     Education provided:   - continue HEP    Written Home Exercises Provided: Patient instructed to cont prior HEP. Exercises were reviewed and Soraida was able to demonstrate them prior to the end of the session.  Soraida demonstrated good understanding of the education provided. See EMR under Patient Instructions for exercises provided during therapy sessions    ASSESSMENT     Tiffanie is continuing to progress well with good carryover with control with abdominal bracing work with blood pressure cuff. Progressed with further core stability work and continued hip/glute strengthening. She continues to demonstrate difficulty with lumbar flexion with increased hip mobility and further added rockbacks with emphasis on progressing controlling lumbar flexion to help with good tolerance. Added periscapular strengthening as well for more proximal stability and control. Will continue to monitor and progress as able.     Soraida is progressing well towards her goals.   Pt prognosis is Good.     Pt will continue to benefit from skilled outpatient physical therapy to address the deficits listed in the problem list box on initial evaluation, provide pt/family education and to maximize pt's level of independence in the home and community " environment.     Pt's spiritual, cultural and educational needs considered and pt agreeable to plan of care and goals.     Anticipated barriers to physical therapy: Scheduling, chronicity of symptoms    Goals:   Short Term Goals: 4 weeks   Patient will be independent in initial HEP to help supplement PT. - MET  Patient will report no pain with lumbar range of motion to help with ADLs.  (Progressing, not met)  Patient will improve hip extension to 10 degrees to help with gait mechanics.  (Progressing, not met)     Long Term Goals: 8-12 weeks   Patient will be independent in updated HEPT to help supplement PT and maintain gains made in PT.  (Progressing, not met)  Patient will improve FOTO score to >/= predicted (69) to show improvement in condition.  (Progressing, not met)  Patient will improve hip strength by 1/2 MMT to help with return to gymnastics.  (Progressing, not met)  Patient goal: Will be able to return to gymnastics pain free. (Progressing, not met)    PLAN     Progress core training and posterior oblique sling motor control     Melanie Arora, PT, DPT, OCS

## 2024-08-05 ENCOUNTER — TELEPHONE (OUTPATIENT)
Dept: PAIN MEDICINE | Facility: CLINIC | Age: 24
End: 2024-08-05
Payer: OTHER GOVERNMENT

## 2024-08-05 ENCOUNTER — PATIENT MESSAGE (OUTPATIENT)
Dept: PAIN MEDICINE | Facility: CLINIC | Age: 24
End: 2024-08-05
Payer: OTHER GOVERNMENT

## 2024-08-07 ENCOUNTER — CLINICAL SUPPORT (OUTPATIENT)
Dept: REHABILITATION | Facility: HOSPITAL | Age: 24
End: 2024-08-07
Payer: OTHER GOVERNMENT

## 2024-08-07 DIAGNOSIS — R29.898 DECREASED STRENGTH OF LOWER EXTREMITY: Primary | ICD-10-CM

## 2024-08-07 PROCEDURE — 97112 NEUROMUSCULAR REEDUCATION: CPT

## 2024-08-13 ENCOUNTER — CLINICAL SUPPORT (OUTPATIENT)
Dept: REHABILITATION | Facility: HOSPITAL | Age: 24
End: 2024-08-13
Payer: OTHER GOVERNMENT

## 2024-08-13 DIAGNOSIS — R29.898 DECREASED STRENGTH OF LOWER EXTREMITY: Primary | ICD-10-CM

## 2024-08-13 PROCEDURE — 97112 NEUROMUSCULAR REEDUCATION: CPT

## 2024-08-13 NOTE — PROGRESS NOTES
"[facility]   Physical Therapy Treatment Note     Name: Soraida Agarwal  Clinic Number: [id number]    Therapy Diagnosis:   Encounter Diagnosis   Name Primary?    Decreased strength of lower extremity Yes     Physician: Jennifer Rolon,*    Visit Date: 8/13/2024  Physician Orders: PT Eval and Treat   Medical Diagnosis from Referral: Lumbar back pain [M54.50], DDD (degenerative disc disease), cervical [M50.30]   Evaluation Date: 7/16/2024  Authorization Period Expiration: 12/31/2024  Plan of Care Expiration: 10/20/2024  Progress Note Due: 8/15/2024  Visit # / Visits authorized: 5/15  FOTO: 1/3     Precautions: Standard      Time In: 4:01 pm  Time Out: 4:55 pm   Total Billable Time: 54 minutes- eli (25)    SUBJECTIVE     Pt reports: Her back has been feeling better. Her neck has been bothering her more. It feels like her neck is trying to hold her together and working harder than it should. She goes back to school next week. She would like a copy of updated exercises after next session to continue until she can get into PT up [place].   She was compliant with home exercise program.  Response to previous treatment: improved back pain   Functional change: no change    Pain: 2/10  Location: bilateral back      OBJECTIVE     Objective Measures updated at progress report unless specified.     Treatment     Soraida received the treatments listed below:      THERAPEUTIC EXERCISES to develop strength, endurance, ROM, flexibility, posture, and core stabilization for 00 minutes including:    MANUAL THERAPY TECHNIQUES including Joint mobilizations and Soft tissue Mobilization were applied to l/s for 04 minutes.    Supine thoracic flicks     NEUROMUSCULAR RE-EDUCATION ACTIVITIES to improve Balance, Coordination, Kinesthetic, Sense, Proprioception, and Posture for 50 minutes.  The following were included:     Sidelying hip ER pretzel glute isometrics 2 x 10 x 5" holds   TA activation with " "physioball with LE ext 2 x 8 reps   Chin tucks with BP cuff (20-24 mmHg) 2 x 10 x 5" holds   Prone middle traps level I 2 x 10 x 3" holds   Prone low traps level II 2 x 8 x 3" holds   Modifed side plank with elbows and knees 2 x 30" each side   Golf  paloff press light band 2 x 10 reps each side     NP Today:   PPT + Lat pull; GTB; 3 x 10 ea  Quadruped flexion into rockback 2 x 10 reps   Quadruped mini LE extension with maintaining neutral spine 2 x 6 reps each  BP cuff PPT (40-50 mmHg) 10 x 10" holds   BP cuff PPT with BKFO 2 x 10 reps each  Abdominal bracing + glute set; 20 x 5"   TA activation with physioball 2 x 10 x 5" holds  Mini bridge; GTB; 3 x 10 x 5"   Modified side plank at wall + abdominal bracing; 4 x 30"   Lower abdominal leg lower to wall; 30 deg; 3 x 10     THERAPEUTIC ACTIVITIES to improve dynamic and functional performance for 00 minutes including.      Patient Education and Home Exercises     Home Exercises Provided and Patient Education Provided     Education provided:   - continue HEP    Written Home Exercises Provided: Patient instructed to cont prior HEP. Exercises were reviewed and Soraida was able to demonstrate them prior to the end of the session.  Soraida demonstrated good understanding of the education provided. See EMR under Patient Instructions for exercises provided during therapy sessions    ASSESSMENT     Soraida is continuing to demonstrate improvement in her core activation and progressing with hip intrinsic strengthening. She presented to today's session with increased discomfort in her neck and further stability work in her neck to help with control as well as postural/periscapular strengthening. She fatigues quickly with mid and low trap strengthening with noticeable muscle fasciculations and cueing for proper form. She was able to progress with weightbearing core stability work today as well with no adverse effects. Will continue to monitor and progress as able. "     Soraida is progressing well towards her goals.   Pt prognosis is Good.     Pt will continue to benefit from skilled outpatient physical therapy to address the deficits listed in the problem list box on initial evaluation, provide pt/family education and to maximize pt's level of independence in the home and community environment.     Pt's spiritual, cultural and educational needs considered and pt agreeable to plan of care and goals.     Anticipated barriers to physical therapy: Scheduling, chronicity of symptoms    Goals:   Short Term Goals: 4 weeks   Patient will be independent in initial HEP to help supplement PT. - MET  Patient will report no pain with lumbar range of motion to help with ADLs.  (Progressing, not met)  Patient will improve hip extension to 10 degrees to help with gait mechanics.  (Progressing, not met)     Long Term Goals: 8-12 weeks   Patient will be independent in updated HEPT to help supplement PT and maintain gains made in PT.  (Progressing, not met)  Patient will improve FOTO score to >/= predicted (69) to show improvement in condition.  (Progressing, not met)  Patient will improve hip strength by 1/2 MMT to help with return to gymnastics.  (Progressing, not met)  Patient goal: Will be able to return to gymnastics pain free. (Progressing, not met)    PLAN     Progress core training and posterior oblique sling motor control     Melanie Arora, PT, DPT, OCS

## 2024-08-15 ENCOUNTER — PATIENT MESSAGE (OUTPATIENT)
Dept: INTERNAL MEDICINE | Facility: CLINIC | Age: 24
End: 2024-08-15
Payer: OTHER GOVERNMENT

## 2024-08-15 ENCOUNTER — CLINICAL SUPPORT (OUTPATIENT)
Dept: REHABILITATION | Facility: HOSPITAL | Age: 24
End: 2024-08-15
Payer: OTHER GOVERNMENT

## 2024-08-15 DIAGNOSIS — R29.898 DECREASED STRENGTH OF LOWER EXTREMITY: Primary | ICD-10-CM

## 2024-08-15 PROCEDURE — 97112 NEUROMUSCULAR REEDUCATION: CPT

## 2024-08-21 ENCOUNTER — PATIENT MESSAGE (OUTPATIENT)
Dept: REHABILITATION | Facility: HOSPITAL | Age: 24
End: 2024-08-21
Payer: OTHER GOVERNMENT

## 2024-08-23 ENCOUNTER — PATIENT MESSAGE (OUTPATIENT)
Dept: INTERNAL MEDICINE | Facility: CLINIC | Age: 24
End: 2024-08-23
Payer: OTHER GOVERNMENT

## 2024-08-23 DIAGNOSIS — F90.8 ATTENTION DEFICIT HYPERACTIVITY DISORDER (ADHD), OTHER TYPE: ICD-10-CM

## 2024-08-24 NOTE — TELEPHONE ENCOUNTER
No care due was identified.  [facility] Embedded Care Due Messages. Reference number: [id number].   8/24/2024 8:12:17 AM CDT

## 2024-08-27 RX ORDER — METHYLPHENIDATE HYDROCHLORIDE 18 MG/1
18 TABLET ORAL EVERY MORNING
Qty: 30 TABLET | Refills: 0 | Status: SHIPPED | OUTPATIENT
Start: 2024-08-27

## 2024-08-29 RX ORDER — METHYLPHENIDATE HYDROCHLORIDE 18 MG/1
18 TABLET ORAL EVERY MORNING
Qty: 30 TABLET | Refills: 0 | OUTPATIENT
Start: 2024-08-29

## 2024-08-29 NOTE — TELEPHONE ENCOUNTER
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 Requesting her meds be sent to [facility] in [place], pended

## 2024-09-05 NOTE — PLAN OF CARE
[facility]   Physical Therapy Re-Assessment / Treatment Note     Name: Soraida Agarwal  Clinic Number: [id number]    Therapy Diagnosis:   Encounter Diagnosis   Name Primary?    Decreased strength of lower extremity Yes     Physician: Jennifer Rolon,*    Visit Date: 8/15/2024  Physician Orders: PT Eval and Treat   Medical Diagnosis from Referral: Lumbar back pain [M54.50], DDD (degenerative disc disease), cervical [M50.30]   Evaluation Date: 7/16/2024  Authorization Period Expiration: 12/31/2024  Plan of Care Expiration: 10/20/2024  Progress Note Due: 8/15/2024  Visit # / Visits authorized: 7/15  FOTO: 1/3     Precautions: Standard      Time In: 4:02 pm   Time Out: 4:57 pm   Total Billable Time: 55 minutes- eli (20)    SUBJECTIVE     Pt reports: Her back is doing a little better her neck is still bothering her some. She is getting better, she is scheduled to return to school next week.   She was compliant with home exercise program.  Response to previous treatment: improved back pain   Functional change: no change    Pain: 2/10  Location: bilateral back      OBJECTIVE     Objective Measures updated at progress report unless specified.     Observation 8/15/2024:    Range of Motion:  Lumbar    Percentage Pain   Flexion 90% No Pain   Extension 70% No Pain   Right Sidebend  90% No Pain   Left Sidebend 70% No Pain   Right Rotation 90% No Pain   Left Rotation 90% No Pain   Quadrant       Flex, R SB, R Rot N/A No Pain   Flex, L SB, L Rot N/A No Pain   Ext, R SB, R Rot N/A No Pain   Ext, L SB, L Rot N/A No Pain   *Noticeable increased hip flexion with lumbar flexion assessment, limited segmental mobility into flexion of lumbar spine.      Hip    Right  Left    Flexion 110 degrees  110 degrees    Extension 5 degrees*  5 degrees*    Internal Rotation 30 degrees  30 degrees    External Rotation 50 degrees  50 degrees    *Patient reported pain with hip extension in low back      Lower  "Extremity Strength (MMT):    Right Left   Hip Flexion 4-/5 4-/5   Hip Abduction 4-/5 4-/5   Hip Extension 3+/5 3+/5   Knee Flexion 4/5 4/5   Knee Extension 4+/5 4+/5   Dorsiflexion 4+/5 4+/5   Plantarflexion 4/5 4/5     Treatment     Soraida received the treatments listed below:      THERAPEUTIC EXERCISES to develop strength, endurance, ROM, flexibility, posture, and core stabilization for 00 minutes including:    MANUAL THERAPY TECHNIQUES including Joint mobilizations and Soft tissue Mobilization were applied to l/s for 03 minutes.    Supine thoracic flicks    NEUROMUSCULAR RE-EDUCATION ACTIVITIES to improve Balance, Coordination, Kinesthetic, Sense, Proprioception, and Posture for 52 minutes.  The following were included:     Assessment as above   TA activation with physioball with LE ext 2 x 8 reps   Prone middle traps level I 2 x 10 x 3" holds   Prone low traps level II 2 x 8 x 3" holds   Hip ER on the wall GreenTB 2 x 12 reps   Sidelying hip ER pretzel glute isometrics 2 x 10 x 5" holds   Modifed side plank with elbows and knees 2 x 30" each side     NP Today:   PPT + Lat pull; GTB; 3 x 10 ea  Quadruped flexion into rockback 2 x 10 reps   Quadruped mini LE extension with maintaining neutral spine 2 x 6 reps each  BP cuff PPT (40-50 mmHg) 10 x 10" holds   BP cuff PPT with BKFO 2 x 10 reps each  Abdominal bracing + glute set; 20 x 5"   TA activation with physioball 2 x 10 x 5" holds  Mini bridge; GTB; 3 x 10 x 5"   Modified side plank at wall + abdominal bracing; 4 x 30"   Lower abdominal leg lower to wall; 30 deg; 3 x 10   Chin tucks with BP cuff (20-24 mmHg) 2 x 10 x 5" holds   Golf  paloff press light band 2 x 10 reps each side     THERAPEUTIC ACTIVITIES to improve dynamic and functional performance for 00 minutes including.      Patient Education and Home Exercises     Home Exercises Provided and Patient Education Provided     Education provided:   - continue HEP    Written Home Exercises Provided: " Patient instructed to cont prior HEP. Exercises were reviewed and Soraida was able to demonstrate them prior to the end of the session.  Soraida demonstrated good understanding of the education provided. See EMR under Patient Instructions for exercises provided during therapy sessions    ASSESSMENT     Soraida demonstrates some improvement in range of motion and subjective reports of improvement in symptoms. She continues with limitations in full mobility and significant limitations in strength effecting full return to prior level of function. She continues to be challenged with progressing core stability and furthering hip/glute strengthening as well. Continued periscapular strengthening as well with significant challenge and fatigue following. At this time patient will be returning to school up [place] and to be provided with updated exercises to continue.    Soraida is progressing well towards her goals.   Pt prognosis is Good.     Pt will continue to benefit from skilled outpatient physical therapy to address the deficits listed in the problem list box on initial evaluation, provide pt/family education and to maximize pt's level of independence in the home and community environment.     Pt's spiritual, cultural and educational needs considered and pt agreeable to plan of care and goals.     Anticipated barriers to physical therapy: Scheduling, chronicity of symptoms    Goals:   Short Term Goals: 4 weeks   Patient will be independent in initial HEP to help supplement PT. - MET  Patient will report no pain with lumbar range of motion to help with ADLs.  - MET  Patient will improve hip extension to 10 degrees to help with gait mechanics.  (Progressing, not met)     Long Term Goals: 8-12 weeks   Patient will be independent in updated HEPT to help supplement PT and maintain gains made in PT.  (Progressing, not met)  Patient will improve FOTO score to >/= predicted (69) to show improvement in condition.   (Progressing, not met)  Patient will improve hip strength by 1/2 MMT to help with return to gymnastics.  (Progressing, not met)  Patient goal: Will be able to return to gymnastics pain free. (Progressing, not met)    PLAN     Progress core training and posterior oblique sling motor control     Melanie Arora, PT, DPT, OCS

## 2024-09-05 NOTE — PROGRESS NOTES
[facility]   Physical Therapy Re-Assessment / Treatment Note     Name: Soraida Agarwal  Clinic Number: [id number]    Therapy Diagnosis:   Encounter Diagnosis   Name Primary?    Decreased strength of lower extremity Yes     Physician: Jennifer Rolon,*    Visit Date: 8/15/2024  Physician Orders: PT Eval and Treat   Medical Diagnosis from Referral: Lumbar back pain [M54.50], DDD (degenerative disc disease), cervical [M50.30]   Evaluation Date: 7/16/2024  Authorization Period Expiration: 12/31/2024  Plan of Care Expiration: 10/20/2024  Progress Note Due: 8/15/2024  Visit # / Visits authorized: 7/15  FOTO: 1/3     Precautions: Standard      Time In: 4:02 pm   Time Out: 4:57 pm   Total Billable Time: 55 minutes- eli (20)    SUBJECTIVE     Pt reports: Her back is doing a little better her neck is still bothering her some. She is getting better, she is scheduled to return to school next week.   She was compliant with home exercise program.  Response to previous treatment: improved back pain   Functional change: no change    Pain: 2/10  Location: bilateral back      OBJECTIVE     Objective Measures updated at progress report unless specified.     Observation 8/15/2024:    Range of Motion:  Lumbar    Percentage Pain   Flexion 90% No Pain   Extension 70% No Pain   Right Sidebend  90% No Pain   Left Sidebend 70% No Pain   Right Rotation 90% No Pain   Left Rotation 90% No Pain   Quadrant       Flex, R SB, R Rot N/A No Pain   Flex, L SB, L Rot N/A No Pain   Ext, R SB, R Rot N/A No Pain   Ext, L SB, L Rot N/A No Pain   *Noticeable increased hip flexion with lumbar flexion assessment, limited segmental mobility into flexion of lumbar spine.      Hip    Right  Left    Flexion 110 degrees  110 degrees    Extension 5 degrees*  5 degrees*    Internal Rotation 30 degrees  30 degrees    External Rotation 50 degrees  50 degrees    *Patient reported pain with hip extension in low back      Lower  "Extremity Strength (MMT):    Right Left   Hip Flexion 4-/5 4-/5   Hip Abduction 4-/5 4-/5   Hip Extension 3+/5 3+/5   Knee Flexion 4/5 4/5   Knee Extension 4+/5 4+/5   Dorsiflexion 4+/5 4+/5   Plantarflexion 4/5 4/5     Treatment     Soraida received the treatments listed below:      THERAPEUTIC EXERCISES to develop strength, endurance, ROM, flexibility, posture, and core stabilization for 00 minutes including:    MANUAL THERAPY TECHNIQUES including Joint mobilizations and Soft tissue Mobilization were applied to l/s for 03 minutes.    Supine thoracic flicks    NEUROMUSCULAR RE-EDUCATION ACTIVITIES to improve Balance, Coordination, Kinesthetic, Sense, Proprioception, and Posture for 52 minutes.  The following were included:     Assessment as above   TA activation with physioball with LE ext 2 x 8 reps   Prone middle traps level I 2 x 10 x 3" holds   Prone low traps level II 2 x 8 x 3" holds   Hip ER on the wall GreenTB 2 x 12 reps   Sidelying hip ER pretzel glute isometrics 2 x 10 x 5" holds   Modifed side plank with elbows and knees 2 x 30" each side     NP Today:   PPT + Lat pull; GTB; 3 x 10 ea  Quadruped flexion into rockback 2 x 10 reps   Quadruped mini LE extension with maintaining neutral spine 2 x 6 reps each  BP cuff PPT (40-50 mmHg) 10 x 10" holds   BP cuff PPT with BKFO 2 x 10 reps each  Abdominal bracing + glute set; 20 x 5"   TA activation with physioball 2 x 10 x 5" holds  Mini bridge; GTB; 3 x 10 x 5"   Modified side plank at wall + abdominal bracing; 4 x 30"   Lower abdominal leg lower to wall; 30 deg; 3 x 10   Chin tucks with BP cuff (20-24 mmHg) 2 x 10 x 5" holds   Golf  paloff press light band 2 x 10 reps each side     THERAPEUTIC ACTIVITIES to improve dynamic and functional performance for 00 minutes including.      Patient Education and Home Exercises     Home Exercises Provided and Patient Education Provided     Education provided:   - continue HEP    Written Home Exercises Provided: " Patient instructed to cont prior HEP. Exercises were reviewed and Soraida was able to demonstrate them prior to the end of the session.  Soraida demonstrated good understanding of the education provided. See EMR under Patient Instructions for exercises provided during therapy sessions    ASSESSMENT     Soraida demonstrates some improvement in range of motion and subjective reports of improvement in symptoms. She continues with limitations in full mobility and significant limitations in strength effecting full return to prior level of function. She continues to be challenged with progressing core stability and furthering hip/glute strengthening as well. Continued periscapular strengthening as well with significant challenge and fatigue following. At this time patient will be returning to school [place] and to be provided with updated exercises to continue.    Soraida is progressing well towards her goals.   Pt prognosis is Good.     Pt will continue to benefit from skilled outpatient physical therapy to address the deficits listed in the problem list box on initial evaluation, provide pt/family education and to maximize pt's level of independence in the home and community environment.     Pt's spiritual, cultural and educational needs considered and pt agreeable to plan of care and goals.     Anticipated barriers to physical therapy: Scheduling, chronicity of symptoms    Goals:   Short Term Goals: 4 weeks   Patient will be independent in initial HEP to help supplement PT. - MET  Patient will report no pain with lumbar range of motion to help with ADLs.  - MET  Patient will improve hip extension to 10 degrees to help with gait mechanics.  (Progressing, not met)     Long Term Goals: 8-12 weeks   Patient will be independent in updated HEPT to help supplement PT and maintain gains made in PT.  (Progressing, not met)  Patient will improve FOTO score to >/= predicted (69) to show improvement in condition.   (Progressing, not met)  Patient will improve hip strength by 1/2 MMT to help with return to gymnastics.  (Progressing, not met)  Patient goal: Will be able to return to gymnastics pain free. (Progressing, not met)    PLAN     Progress core training and posterior oblique sling motor control     Melanie Arora, PT, DPT, OCS

## 2024-11-14 DIAGNOSIS — F90.8 ATTENTION DEFICIT HYPERACTIVITY DISORDER (ADHD), OTHER TYPE: ICD-10-CM

## 2024-11-14 NOTE — TELEPHONE ENCOUNTER
No care due was identified.  [facility] Embedded Care Due Messages. Reference number: [id number].   11/14/2024 2:15:38 PM CST

## 2024-11-14 NOTE — TELEPHONE ENCOUNTER
Refill Routing Note   Medication(s) are not appropriate for processing by [facility] for the following reason(s):        Outside of protocol    [facility] action(s):  Route             Appointments  past 12m or future 3m with PCP    Date Provider   Last Visit   6/24/2024 Tanya Samaniego MD   Next Visit   Visit date not found Tanya Samaniego MD   ED visits in past 90 days: 0        Note composed:2:19 PM 11/14/2024

## 2024-11-17 RX ORDER — METHYLPHENIDATE HYDROCHLORIDE 18 MG/1
18 TABLET ORAL EVERY MORNING
Qty: 30 TABLET | Refills: 0 | Status: SHIPPED | OUTPATIENT
Start: 2024-11-17

## 2024-12-16 DIAGNOSIS — F90.8 ATTENTION DEFICIT HYPERACTIVITY DISORDER (ADHD), OTHER TYPE: ICD-10-CM

## 2024-12-16 NOTE — TELEPHONE ENCOUNTER
No care due was identified.  [facility] Embedded Care Due Messages. Reference number: [id number].   12/16/2024 8:45:30 AM CST

## 2024-12-17 RX ORDER — METHYLPHENIDATE HYDROCHLORIDE 18 MG/1
18 TABLET ORAL EVERY MORNING
Qty: 30 TABLET | Refills: 0 | Status: SHIPPED | OUTPATIENT
Start: 2024-12-17

## 2025-07-27 NOTE — TELEPHONE ENCOUNTER
Care Due:                  Date            Visit Type   Department     Provider  --------------------------------------------------------------------------------                                EP -                              PRIMARY      [facility] INTERNAL  Last Visit: 06-      CARE ([facility])   MEDICINE       Tanya Samaniego  Next Visit: None Scheduled  None         None Found                                                            Last  Test          Frequency    Reason                     Performed    Due Date  --------------------------------------------------------------------------------    Office Visit  15 months..  EScitalopram.............  06- 09-    [facility] Embedded Care Due Messages. Reference number: [id number].   7/26/2025 11:53:26 PM CDT

## 2025-07-28 RX ORDER — ESCITALOPRAM OXALATE 5 MG/1
TABLET ORAL
Qty: 90 TABLET | Refills: 3 | Status: SHIPPED | OUTPATIENT
Start: 2025-07-28

## 2025-07-28 NOTE — TELEPHONE ENCOUNTER
Refill Routing Note   Medication(s) are not appropriate for processing by [facility] for the following reason(s):        Clarification of medication (Rx) details    [facility] action(s):  Defer   Requires appointment : Yes               Appointments  past 12m or future 3m with PCP    Date Provider   Last Visit   6/24/2024 Tanya Samaniego MD   Next Visit   Visit date not found Tanya Samaniego MD   ED visits in past 90 days: 0        Note composed:8:37 PM 07/27/2025